# Patient Record
Sex: FEMALE | Race: WHITE | NOT HISPANIC OR LATINO | Employment: FULL TIME | URBAN - NONMETROPOLITAN AREA
[De-identification: names, ages, dates, MRNs, and addresses within clinical notes are randomized per-mention and may not be internally consistent; named-entity substitution may affect disease eponyms.]

---

## 2017-10-10 ENCOUNTER — OFFICE VISIT (OUTPATIENT)
Dept: OBSTETRICS AND GYNECOLOGY | Facility: CLINIC | Age: 32
End: 2017-10-10

## 2017-10-10 VITALS
HEIGHT: 65 IN | DIASTOLIC BLOOD PRESSURE: 68 MMHG | HEART RATE: 62 BPM | BODY MASS INDEX: 28.49 KG/M2 | SYSTOLIC BLOOD PRESSURE: 98 MMHG | WEIGHT: 171 LBS | RESPIRATION RATE: 16 BRPM

## 2017-10-10 DIAGNOSIS — Z01.419 ENCOUNTER FOR GYNECOLOGICAL EXAMINATION WITHOUT ABNORMAL FINDING: Primary | ICD-10-CM

## 2017-10-10 DIAGNOSIS — Z12.4 SCREENING FOR MALIGNANT NEOPLASM OF CERVIX: ICD-10-CM

## 2017-10-10 DIAGNOSIS — Z30.41 ENCOUNTER FOR SURVEILLANCE OF CONTRACEPTIVE PILLS: ICD-10-CM

## 2017-10-10 PROCEDURE — 99395 PREV VISIT EST AGE 18-39: CPT | Performed by: PHYSICIAN ASSISTANT

## 2017-10-10 RX ORDER — OXYCODONE AND ACETAMINOPHEN 7.5; 325 MG/1; MG/1
TABLET ORAL
Refills: 0 | COMMUNITY
Start: 2017-09-26 | End: 2018-11-01

## 2017-10-10 RX ORDER — LANSOPRAZOLE 30 MG/1
CAPSULE, DELAYED RELEASE ORAL
Refills: 0 | COMMUNITY
Start: 2017-07-27 | End: 2018-11-01

## 2017-10-10 RX ORDER — TRAZODONE HYDROCHLORIDE 50 MG/1
TABLET ORAL
Refills: 0 | COMMUNITY
Start: 2017-09-25 | End: 2018-11-01

## 2017-10-10 RX ORDER — MONTELUKAST SODIUM 10 MG/1
10 TABLET ORAL NIGHTLY
Refills: 0 | COMMUNITY
Start: 2017-09-25

## 2017-10-10 RX ORDER — CYCLOBENZAPRINE HCL 10 MG
10 TABLET ORAL NIGHTLY
Refills: 0 | COMMUNITY
Start: 2017-09-25 | End: 2021-03-26 | Stop reason: HOSPADM

## 2017-10-10 RX ORDER — LEVONORGESTREL AND ETHINYL ESTRADIOL 0.1-0.02MG
KIT ORAL
Refills: 11 | COMMUNITY
Start: 2017-07-21 | End: 2017-10-10 | Stop reason: ALTCHOICE

## 2017-10-10 RX ORDER — BUTALBITAL, ACETAMINOPHEN AND CAFFEINE 50; 325; 40 MG/1; MG/1; MG/1
TABLET ORAL
Refills: 0 | COMMUNITY
Start: 2017-09-25 | End: 2021-03-26 | Stop reason: HOSPADM

## 2017-10-10 RX ORDER — LEVONORGESTREL AND ETHINYL ESTRADIOL 0.1-0.02MG
1 KIT ORAL DAILY
Qty: 28 TABLET | Refills: 12 | Status: SHIPPED | OUTPATIENT
Start: 2017-10-10 | End: 2017-10-15 | Stop reason: SDUPTHER

## 2017-10-10 RX ORDER — ONDANSETRON 4 MG/1
TABLET, FILM COATED ORAL
Refills: 0 | COMMUNITY
Start: 2017-09-25 | End: 2021-02-05

## 2017-10-10 NOTE — PROGRESS NOTES
Subjective   Chief Complaint   Patient presents with   • Gynecologic Exam     Patient here for annual exam   • burning with urination       Tammi Collado is a 31 y.o. year old  presenting to be seen for her annual gynecological exam.   She is on oc's and desires refills-has gotten different generic the past few months which is Vienva and feeling hot flashes, night sweats- was on orsythia previously  Her LMP WAS 2017    She reports mild dysuria noted the last 2 days but has resolved-drinks lot of [place]     Past Medical History:   Diagnosis Date   • Acid reflux    • Anxiety    • Asthma    • Bladder incontinence    • Constipation    • Cough    • Depression    • Kidney stones    • Pain with urination    • Urinary frequency         Current Outpatient Prescriptions:   •  butalbital-acetaminophen-caffeine (FIORICET, ESGIC) -40 MG per tablet, take 1 tablet by mouth every 4 hours if needed, Disp: , Rfl: 0  •  cyclobenzaprine (FLEXERIL) 10 MG tablet, take 1 tablet by mouth at bedtime, Disp: , Rfl: 0  •  lansoprazole (PREVACID) 30 MG capsule, take 1 capsule by mouth once daily, Disp: , Rfl: 0  •  montelukast (SINGULAIR) 10 MG tablet, TAKE ONE TABLET BY MOUTH ONCE A DAY, Disp: , Rfl: 0  •  PROAIR  (90 Base) MCG/ACT inhaler, inhale 2 puffs by mouth every 4 to 6 hours if needed, Disp: , Rfl: 0  •  traZODone (DESYREL) 50 MG tablet, TAKE 1/2 OF A TABLET-3 TABLETS BY MOUTH ONCE A DAY AT BEDTIME, Disp: , Rfl: 0  •  ondansetron (ZOFRAN) 4 MG tablet, take 1 tablet by mouth every 6 hours if needed, Disp: , Rfl: 0  •  ORSYTHIA 0.1-20 MG-MCG per tablet, Take 1 tablet by mouth Daily., Disp: 28 tablet, Rfl: 12  •  oxyCODONE-acetaminophen (PERCOCET) 7.5-325 MG per tablet, take 1 tablet by mouth twice a day if needed for pain, Disp: , Rfl: 0   Allergies   Allergen Reactions   • Peanut (Diagnostic)    • Penicillins       Past Surgical History:   Procedure Laterality Date   • KIDNEY STONE SURGERY     •  "OOPHORECTOMY Right 2009      Social History     Social History   • Marital status:      Spouse name: N/A   • Number of children: N/A   • Years of education: N/A     Occupational History   • Not on file.     Social History Main Topics   • Smoking status: Current Every Day Smoker     Packs/day: 1.00   • Smokeless tobacco: Never Used   • Alcohol use No   • Drug use: No   • Sexual activity: Yes     Partners: Male     Other Topics Concern   • Not on file     Social History Narrative   • No narrative on file      Family History   Problem Relation Age of Onset   • Ovarian cancer Mother    • Ovarian cancer Paternal Grandmother    • Stroke Paternal Grandmother    • Diabetes Paternal Grandmother    • Hypertension Paternal Grandmother    • Hyperlipidemia Paternal Grandmother    • Cystic fibrosis Maternal Aunt        Review of Systems   HENT: Positive for sinus pressure.    Respiratory: Positive for cough and wheezing.    Gastrointestinal: Positive for constipation.   Endocrine: Positive for polydipsia.        Hot flashes   Genitourinary: Positive for dysuria.   Psychiatric/Behavioral: Positive for sleep disturbance.        Mood swings           Objective   BP 98/68 (BP Location: Left arm, Patient Position: Sitting, Cuff Size: Adult)  Pulse 62  Resp 16  Ht 65\" (165.1 cm)  Wt 171 lb (77.6 kg)  LMP 09/20/2017  BMI 28.46 kg/m2    Physical Exam   Constitutional: She appears well-developed and well-nourished. She is cooperative.   Pulmonary/Chest: Right breast exhibits no inverted nipple, no mass, no nipple discharge, no skin change and no tenderness. Left breast exhibits no inverted nipple, no mass, no nipple discharge, no skin change and no tenderness.   Abdominal: Soft. Normal appearance. There is no hepatosplenomegaly. There is no tenderness.   Genitourinary: Vagina normal and uterus normal. There is no tenderness or lesion on the right labia. There is no tenderness or lesion on the left labia. Cervix exhibits no " motion tenderness and no discharge. Right adnexum displays no mass and no tenderness. Left adnexum displays no mass and no tenderness.   Neurological: She is alert.   Skin: Skin is warm, dry and intact.   Psychiatric: She has a normal mood and affect. Her behavior is normal.            Assessment and Plan  Tammi was seen today for gynecologic exam and burning with urination.    Diagnoses and all orders for this visit:    Encounter for gynecological examination without abnormal finding    Screening for malignant neoplasm of cervix  -     Liquid-based Pap Smear, Screening - ThinPrep Vial, Cervix; Future    Other orders  -     ORSYTHIA 0.1-20 MG-MCG per tablet; Take 1 tablet by mouth Daily.    There are no Patient Instructions on file for this visit.           This note was electronically signed.    Frances Mocteuzma PA-C   October 10, 2017

## 2017-10-16 RX ORDER — LEVONORGESTREL AND ETHINYL ESTRADIOL 0.1-0.02MG
KIT ORAL
Qty: 84 TABLET | Refills: 3 | Status: SHIPPED | OUTPATIENT
Start: 2017-10-16 | End: 2018-11-01 | Stop reason: CLARIF

## 2017-10-20 DIAGNOSIS — Z12.4 SCREENING FOR MALIGNANT NEOPLASM OF CERVIX: ICD-10-CM

## 2018-08-20 RX ORDER — LEVONORGESTREL AND ETHINYL ESTRADIOL 0.1-0.02MG
1 KIT ORAL DAILY
Qty: 28 TABLET | Refills: 1 | Status: SHIPPED | OUTPATIENT
Start: 2018-08-20 | End: 2018-08-20 | Stop reason: SDUPTHER

## 2018-08-20 RX ORDER — LEVONORGESTREL AND ETHINYL ESTRADIOL 0.1-0.02MG
1 KIT ORAL DAILY
Qty: 84 TABLET | Refills: 0 | Status: SHIPPED | OUTPATIENT
Start: 2018-08-20 | End: 2019-08-20

## 2018-11-01 ENCOUNTER — OFFICE VISIT (OUTPATIENT)
Dept: OBSTETRICS AND GYNECOLOGY | Facility: CLINIC | Age: 33
End: 2018-11-01

## 2018-11-01 VITALS
DIASTOLIC BLOOD PRESSURE: 78 MMHG | HEIGHT: 65 IN | SYSTOLIC BLOOD PRESSURE: 116 MMHG | WEIGHT: 178 LBS | BODY MASS INDEX: 29.66 KG/M2

## 2018-11-01 DIAGNOSIS — Z01.419 ENCOUNTER FOR GYNECOLOGICAL EXAMINATION WITHOUT ABNORMAL FINDING: Primary | ICD-10-CM

## 2018-11-01 PROCEDURE — 99395 PREV VISIT EST AGE 18-39: CPT | Performed by: OBSTETRICS & GYNECOLOGY

## 2018-11-01 RX ORDER — CLONAZEPAM 1 MG/1
1 TABLET ORAL 2 TIMES DAILY PRN
Refills: 0 | COMMUNITY
Start: 2018-10-02 | End: 2021-03-26 | Stop reason: HOSPADM

## 2018-11-01 RX ORDER — IBUPROFEN 800 MG/1
800 TABLET ORAL 3 TIMES DAILY
Refills: 0 | COMMUNITY
Start: 2018-10-02 | End: 2021-03-26 | Stop reason: HOSPADM

## 2018-11-01 RX ORDER — VENLAFAXINE HYDROCHLORIDE 150 MG/1
150 CAPSULE, EXTENDED RELEASE ORAL DAILY
Refills: 0 | COMMUNITY
Start: 2018-10-02

## 2018-11-01 RX ORDER — MECLIZINE HYDROCHLORIDE 25 MG/1
TABLET ORAL
Refills: 0 | COMMUNITY
Start: 2018-10-04 | End: 2019-11-08

## 2018-11-01 RX ORDER — FEXOFENADINE HCL 180 MG/1
180 TABLET ORAL DAILY
Refills: 0 | COMMUNITY
Start: 2018-10-02 | End: 2019-11-08

## 2018-11-01 NOTE — PROGRESS NOTES
Subjective   Chief Complaint   Patient presents with   • Gynecologic Exam     :Last pap 2017 WNL   • Contraception     Needs yearly refill ( name brand only, 90 days at at time)     Tammi Collado is a 33 y.o. year old   presenting to be seen for her annual exam.  Doing well with Orsythia    SEXUAL Hx:  She is currently sexually active.  In the past year there has not been new sexual partners.    Condoms are not typically used.  She would not like to be screened for STD's at today's exam.  Current birth control method: OCP (estrogen/progesterone).  MENSTRUAL Hx:  Patient's last menstrual period was 10/15/2018.  In the past 6 months her cycles have been regular, predictable and occur monthly.   Her menstrual flow is normal.   Each month on average there are roughly 0 days of very heavy flow.    Intermenstrual bleeding is absent.    Post-coital bleeding is absent.  Dysmenorrhea: is not affecting her activities of daily living  PMS: is not affecting her activities of daily living  Her cycles are not a source of concern for her that she wishes to discuss today.  HEALTH Hx:  She exercises regularly: no (and has no plans to become more active).  She wears her seat belt:yes.  She has concerns about domestic violence: no.  OTHER COMPLAINTS:  Nothing else    The following portions of the patient's history were reviewed and updated as appropriate:  She  has a past medical history of Acid reflux; Anxiety; Asthma; Bladder incontinence; Constipation; Cough; Depression; Kidney stones; Pain with urination; and Urinary frequency.  She  does not have a problem list on file.  She  has a past surgical history that includes Kidney stone surgery () and Oophorectomy (Right, 2009).  Her family history includes Cystic fibrosis in her maternal aunt; Diabetes in her paternal grandmother; Hyperlipidemia in her paternal grandmother; Hypertension in her paternal grandmother; Ovarian cancer in her mother and paternal grandmother; Stroke  in her paternal grandmother.  She  reports that she has been smoking.  She has been smoking about 1.00 pack per day. She has never used smokeless tobacco. She reports that she does not drink alcohol or use drugs.  Current Outpatient Prescriptions   Medication Sig Dispense Refill   • butalbital-acetaminophen-caffeine (FIORICET, ESGIC) -40 MG per tablet take 1 tablet by mouth every 4 hours if needed  0   • cyclobenzaprine (FLEXERIL) 10 MG tablet take 1 tablet by mouth at bedtime  0   • montelukast (SINGULAIR) 10 MG tablet TAKE ONE TABLET BY MOUTH ONCE A DAY  0   • ondansetron (ZOFRAN) 4 MG tablet take 1 tablet by mouth every 6 hours if needed  0   • ORSYTHIA 0.1-20 MG-MCG per tablet Take 1 tablet by mouth Daily. 84 tablet 0   • PROAIR  (90 Base) MCG/ACT inhaler inhale 2 puffs by mouth every 4 to 6 hours if needed  0   • clonazePAM (KlonoPIN) 1 MG tablet   0   • fexofenadine (ALLEGRA) 180 MG tablet Take 180 mg by mouth Daily.  0   • ibuprofen (ADVIL,MOTRIN) 800 MG tablet Take 800 mg by mouth 3 (Three) Times a Day.  0   • meclizine (ANTIVERT) 25 MG tablet take 1/2 to 1 tablet by mouth four times a day for dizziness  0   • venlafaxine XR (EFFEXOR-XR) 150 MG 24 hr capsule Take 150 mg by mouth Daily.  0     No current facility-administered medications for this visit.      Current Outpatient Prescriptions on File Prior to Visit   Medication Sig   • butalbital-acetaminophen-caffeine (FIORICET, ESGIC) -40 MG per tablet take 1 tablet by mouth every 4 hours if needed   • cyclobenzaprine (FLEXERIL) 10 MG tablet take 1 tablet by mouth at bedtime   • montelukast (SINGULAIR) 10 MG tablet TAKE ONE TABLET BY MOUTH ONCE A DAY   • ondansetron (ZOFRAN) 4 MG tablet take 1 tablet by mouth every 6 hours if needed   • ORSYTHIA 0.1-20 MG-MCG per tablet Take 1 tablet by mouth Daily.   • PROAIR  (90 Base) MCG/ACT inhaler inhale 2 puffs by mouth every 4 to 6 hours if needed   • [DISCONTINUED] lansoprazole (PREVACID) 30  "MG capsule take 1 capsule by mouth once daily   • [DISCONTINUED] oxyCODONE-acetaminophen (PERCOCET) 7.5-325 MG per tablet take 1 tablet by mouth twice a day if needed for pain   • [DISCONTINUED] traZODone (DESYREL) 50 MG tablet TAKE 1/2 OF A TABLET-3 TABLETS BY MOUTH ONCE A DAY AT BEDTIME   • [DISCONTINUED] VIENVA 0.1-20 MG-MCG per tablet TAKE 1 TABLET BY MOUTH EVERY DAY     No current facility-administered medications on file prior to visit.      She is allergic to peanut (diagnostic) and penicillins..    Smoking status: Current Every Day Smoker                                                   Packs/day: 1.00      Years: 0.00      Smokeless tobacco: Never Used                        Review of Systems  Consitutional NEG: anorexia or night sweats    POS: nothing reported   Gastointestinal NEG: bloating, change in bowel habits, melena or reflux symptoms    POS: nothing reported   Genitourinary NEG: dysuria or hematuria    POS: nothing reported   Integument NEG: moles that are changing in size, shape, color or rashes    POS: nothing reported   Breast NEG: persistent breast lump, skin dimpling or nipple discharge    POS: nothing reported          Objective   /78   Ht 165.1 cm (65\")   Wt 80.7 kg (178 lb)   LMP 10/15/2018   BMI 29.62 kg/m²     General:  well developed; well nourished  no acute distress   Skin:  No suspicious lesions seen   Thyroid: normal to inspection and palpation   Breasts:  Examined in supine position  Symmetric without masses or skin dimpling  Nipples normal without inversion, lesions or discharge  There are no palpable axillary nodes   Abdomen: soft, non-tender; no masses  no umbilical or inginual hernias are present  no hepato-splenomegaly   Pelvis: Clinical staff was present for exam  External genitalia:  normal appearance of the external genitalia including Bartholin's and [place]'s glands.  :  urethral meatus normal;  Vaginal:  normal pink mucosa without prolapse or lesions.  Cervix:  " normal appearance.  Uterus:  normal size, shape and consistency.  Adnexa:  normal bimanual exam of the adnexa.  Rectal:  digital rectal exam not performed; anus visually normal appearing.        Assessment   1. Normal PE     Plan   1. PAP done  2. Orsythia refilled, slight AUB /psotting with it  Follow up if AUB persists  No orders of the defined types were placed in this encounter.         This note was electronically signed.      November 1, 2018

## 2018-11-19 DIAGNOSIS — Z01.419 ENCOUNTER FOR GYNECOLOGICAL EXAMINATION WITHOUT ABNORMAL FINDING: ICD-10-CM

## 2018-12-13 ENCOUNTER — TELEPHONE (OUTPATIENT)
Dept: OBSTETRICS AND GYNECOLOGY | Facility: CLINIC | Age: 33
End: 2018-12-13

## 2018-12-13 RX ORDER — LEVONORGESTREL AND ETHINYL ESTRADIOL 0.1-0.02MG
1 KIT ORAL DAILY
Qty: 28 TABLET | Refills: 0 | Status: SHIPPED | OUTPATIENT
Start: 2018-12-13 | End: 2019-11-08

## 2018-12-13 NOTE — TELEPHONE ENCOUNTER
----- Message from Laila Marin sent at 12/13/2018 11:55 AM EST -----  Contact: PATIENT  PT NEEDS ONE PACK OF ORSYTHIA SENT IN TO THE PHARMACY UNTIL SHE GETS HER MAIL ORDER. IT HAS TO BE NAME BRAND SHE STATES    JASON BRIGHT

## 2019-11-08 ENCOUNTER — OFFICE VISIT (OUTPATIENT)
Dept: OBSTETRICS AND GYNECOLOGY | Facility: CLINIC | Age: 34
End: 2019-11-08

## 2019-11-08 VITALS
HEIGHT: 65 IN | DIASTOLIC BLOOD PRESSURE: 70 MMHG | WEIGHT: 167 LBS | SYSTOLIC BLOOD PRESSURE: 112 MMHG | BODY MASS INDEX: 27.82 KG/M2

## 2019-11-08 DIAGNOSIS — Z01.419 ENCOUNTER FOR GYNECOLOGICAL EXAMINATION WITHOUT ABNORMAL FINDING: Primary | ICD-10-CM

## 2019-11-08 PROCEDURE — 99395 PREV VISIT EST AGE 18-39: CPT | Performed by: OBSTETRICS & GYNECOLOGY

## 2019-11-08 RX ORDER — AMITRIPTYLINE HYDROCHLORIDE 10 MG/1
25 TABLET, FILM COATED ORAL NIGHTLY
Refills: 0 | COMMUNITY
Start: 2019-10-08 | End: 2020-12-02 | Stop reason: DRUGHIGH

## 2019-11-08 RX ORDER — HYDROXYZINE PAMOATE 25 MG/1
CAPSULE ORAL
Refills: 0 | COMMUNITY
Start: 2019-10-28 | End: 2020-08-25

## 2019-11-08 RX ORDER — FLUTICASONE PROPIONATE 50 MCG
SPRAY, SUSPENSION (ML) NASAL
COMMUNITY
End: 2020-08-25

## 2019-11-08 RX ORDER — GALCANEZUMAB 120 MG/ML
INJECTION, SOLUTION SUBCUTANEOUS
Refills: 0 | COMMUNITY
Start: 2019-10-29 | End: 2020-08-25 | Stop reason: SDUPTHER

## 2019-11-08 RX ORDER — EPINEPHRINE 0.3 MG/.3ML
INJECTION SUBCUTANEOUS
COMMUNITY

## 2019-11-08 RX ORDER — LEVONORGESTREL / ETHINYL ESTRADIOL AND ETHINYL ESTRADIOL 150-30(84)
1 KIT ORAL DAILY
Qty: 84 EACH | Refills: 4 | Status: SHIPPED | OUTPATIENT
Start: 2019-11-08 | End: 2020-08-25

## 2019-11-08 RX ORDER — SUMATRIPTAN 100 MG/1
TABLET, FILM COATED ORAL
Refills: 0 | COMMUNITY
Start: 2019-10-28

## 2019-11-08 NOTE — PROGRESS NOTES
Subjective   Chief Complaint   Patient presents with   • Gynecologic Exam     last pap 2018 WNL , states she has not been taking her BC for over a month due to pharmacy not having in stock     Tammi Collado is a 34 y.o. year old  presenting to be seen for her annual exam. C/o Left sided pimple like spot for couple of weeks.   SEXUAL Hx:  She is currently sexually active.  In the past year there has not been new sexual partners.    Condoms are not typically used.  She would not like to be screened for STD's at today's exam.  Current birth control method: OCP (estrogen/progesterone).  MENSTRUAL Hx:  Patient's last menstrual period was 10/15/2019.  In the past 6 months her cycles have been regular, predictable and occur monthly.   Her menstrual flow is normal.   Each month on average there are roughly 0 days of very heavy flow.    Intermenstrual bleeding is absent.    Post-coital bleeding is absent.  Dysmenorrhea: is not affecting her activities of daily living  PMS: is not affecting her activities of daily living  Her cycles are not a source of concern for her that she wishes to discuss today.  HEALTH Hx:  She exercises regularly: no (and has no plans to become more active).  She wears her seat belt:yes.  She has concerns about domestic violence: no.  OTHER COMPLAINTS:  Nothing else    The following portions of the patient's history were reviewed and updated as appropriate:  She  has a past medical history of Acid reflux, Anxiety, Asthma, Bladder incontinence, Constipation, Cough, Depression, Kidney stones, Pain with urination, and Urinary frequency.  She does not have a problem list on file.  She  has a past surgical history that includes Kidney stone surgery () and Oophorectomy (Right, 2009).  Her family history includes Cystic fibrosis in her maternal aunt; Diabetes in her paternal grandmother; Hyperlipidemia in her paternal grandmother; Hypertension in her paternal grandmother; Ovarian cancer in her  mother and paternal grandmother; Stroke in her paternal grandmother.  She  reports that she has been smoking.  She has been smoking about 1.00 pack per day. She has never used smokeless tobacco. She reports that she does not drink alcohol or use drugs.  Current Outpatient Medications   Medication Sig Dispense Refill   • butalbital-acetaminophen-caffeine (FIORICET, ESGIC) -40 MG per tablet take 1 tablet by mouth every 4 hours if needed  0   • clonazePAM (KlonoPIN) 1 MG tablet   0   • cyclobenzaprine (FLEXERIL) 10 MG tablet take 1 tablet by mouth at bedtime  0   • ibuprofen (ADVIL,MOTRIN) 800 MG tablet Take 800 mg by mouth 3 (Three) Times a Day.  0   • montelukast (SINGULAIR) 10 MG tablet TAKE ONE TABLET BY MOUTH ONCE A DAY  0   • ondansetron (ZOFRAN) 4 MG tablet take 1 tablet by mouth every 6 hours if needed  0   • PROAIR  (90 Base) MCG/ACT inhaler inhale 2 puffs by mouth every 4 to 6 hours if needed  0   • venlafaxine XR (EFFEXOR-XR) 150 MG 24 hr capsule Take 150 mg by mouth Daily.  0   • amitriptyline (ELAVIL) 10 MG tablet take 1 tablet by mouth every evening AT 8 PM AND MAY INCREASE TO ...  (REFER TO PRESCRIPTION NOTES).  0   • EMGALITY 120 MG/ML prefilled syringe   0   • EPINEPHrine (EPIPEN) 0.3 MG/0.3ML solution auto-injector injection epinephrine 0.3 mg/0.3 mL injection, auto-injector     • fluticasone (FLONASE) 50 MCG/ACT nasal spray fluticasone propionate 50 mcg/actuation nasal spray,suspension     • hydrOXYzine pamoate (VISTARIL) 25 MG capsule take 1 capsule by mouth twice a day for anxiety  0   • levonorgestrel-ethinyl estradiol (AVIANE,ALESSE,LESSINA) 0.1-20 MG-MCG per tablet Take 1 tablet by mouth Daily. 28 tablet 0   • SUMAtriptan (IMITREX) 100 MG tablet   0     No current facility-administered medications for this visit.      Current Outpatient Medications on File Prior to Visit   Medication Sig   • butalbital-acetaminophen-caffeine (FIORICET, ESGIC) -40 MG per tablet take 1 tablet by  mouth every 4 hours if needed   • clonazePAM (KlonoPIN) 1 MG tablet    • cyclobenzaprine (FLEXERIL) 10 MG tablet take 1 tablet by mouth at bedtime   • ibuprofen (ADVIL,MOTRIN) 800 MG tablet Take 800 mg by mouth 3 (Three) Times a Day.   • montelukast (SINGULAIR) 10 MG tablet TAKE ONE TABLET BY MOUTH ONCE A DAY   • ondansetron (ZOFRAN) 4 MG tablet take 1 tablet by mouth every 6 hours if needed   • PROAIR  (90 Base) MCG/ACT inhaler inhale 2 puffs by mouth every 4 to 6 hours if needed   • venlafaxine XR (EFFEXOR-XR) 150 MG 24 hr capsule Take 150 mg by mouth Daily.   • amitriptyline (ELAVIL) 10 MG tablet take 1 tablet by mouth every evening AT 8 PM AND MAY INCREASE TO ...  (REFER TO PRESCRIPTION NOTES).   • EMGALITY 120 MG/ML prefilled syringe    • EPINEPHrine (EPIPEN) 0.3 MG/0.3ML solution auto-injector injection epinephrine 0.3 mg/0.3 mL injection, auto-injector   • fluticasone (FLONASE) 50 MCG/ACT nasal spray fluticasone propionate 50 mcg/actuation nasal spray,suspension   • hydrOXYzine pamoate (VISTARIL) 25 MG capsule take 1 capsule by mouth twice a day for anxiety   • levonorgestrel-ethinyl estradiol (AVIANE,ALESSE,LESSINA) 0.1-20 MG-MCG per tablet Take 1 tablet by mouth Daily.   • SUMAtriptan (IMITREX) 100 MG tablet    • [DISCONTINUED] fexofenadine (ALLEGRA) 180 MG tablet Take 180 mg by mouth Daily.   • [DISCONTINUED] meclizine (ANTIVERT) 25 MG tablet take 1/2 to 1 tablet by mouth four times a day for dizziness     No current facility-administered medications on file prior to visit.      She is allergic to peanut (diagnostic) and penicillins..    Social History    Tobacco Use      Smoking status: Current Every Day Smoker        Packs/day: 1.00      Smokeless tobacco: Never Used    Review of Systems  Consitutional NEG: anorexia or night sweats    POS: nothing reported   Gastointestinal NEG: bloating, change in bowel habits, melena or reflux symptoms    POS: nothing reported   Genitourinary NEG: dysuria or  "hematuria    POS: nothing reported   Integument NEG: moles that are changing in size, shape, color or rashes    POS: nothing reported   Breast NEG: persistent breast lump, skin dimpling or nipple discharge    POS: nothing reported          Objective   /70   Ht 165.1 cm (65\")   Wt 75.8 kg (167 lb)   LMP 10/15/2019   BMI 27.79 kg/m²     General:  well developed; well nourished  no acute distress   Skin:  No suspicious lesions seen   Thyroid: normal to inspection and palpation   Breasts:  Examined in supine position  Symmetric without masses or skin dimpling  Nipples normal without inversion, lesions or discharge  There are no palpable axillary nodes   Abdomen: soft, non-tender; no masses  no umbilical or inguinal hernias are present  no hepato-splenomegaly   Pelvis: Clinical staff was present for exam  External genitalia:  normal appearance of the external genitalia including Bartholin's and [place]'s glands.  :  urethral meatus normal;  Vaginal:  normal pink mucosa without prolapse or lesions.  Cervix:  normal appearance.  Uterus:  normal size, shape and consistency.  Adnexa:  normal bimanual exam of the adnexa.  Rectal:  digital rectal exam not performed; anus visually normal appearing.        Assessment   1. Annual PE WNL  2. PAtient states cousin with Bresat Ca @ 36 yoa and this cousin's motther also with Breast Ca - normal bresat exam-- start MMG @ 35 yoa  3. Menstrual migraines     Plan   1. PAP done  2. Change over to [place]  3. Simple left inclsuion cyst-- hot moist comporesses    No orders of the defined types were placed in this encounter.         This note was electronically signed.      November 8, 2019    "

## 2019-11-18 DIAGNOSIS — Z01.419 ENCOUNTER FOR GYNECOLOGICAL EXAMINATION WITHOUT ABNORMAL FINDING: ICD-10-CM

## 2020-08-25 ENCOUNTER — OFFICE VISIT (OUTPATIENT)
Dept: NEUROLOGY | Facility: CLINIC | Age: 35
End: 2020-08-25

## 2020-08-25 ENCOUNTER — LAB (OUTPATIENT)
Dept: LAB | Facility: HOSPITAL | Age: 35
End: 2020-08-25

## 2020-08-25 VITALS
DIASTOLIC BLOOD PRESSURE: 76 MMHG | WEIGHT: 171.2 LBS | BODY MASS INDEX: 28.52 KG/M2 | SYSTOLIC BLOOD PRESSURE: 112 MMHG | HEART RATE: 84 BPM | HEIGHT: 65 IN | OXYGEN SATURATION: 99 % | TEMPERATURE: 98.4 F

## 2020-08-25 DIAGNOSIS — G43.719 INTRACTABLE CHRONIC MIGRAINE WITHOUT AURA AND WITHOUT STATUS MIGRAINOSUS: ICD-10-CM

## 2020-08-25 DIAGNOSIS — G43.719 INTRACTABLE CHRONIC MIGRAINE WITHOUT AURA AND WITHOUT STATUS MIGRAINOSUS: Primary | ICD-10-CM

## 2020-08-25 DIAGNOSIS — Z71.6 ENCOUNTER FOR SMOKING CESSATION COUNSELING: ICD-10-CM

## 2020-08-25 LAB
ALBUMIN SERPL-MCNC: 4.5 G/DL (ref 3.5–5.2)
ALBUMIN/GLOB SERPL: 2 G/DL
ALP SERPL-CCNC: 55 U/L (ref 39–117)
ALT SERPL W P-5'-P-CCNC: 22 U/L (ref 1–33)
ANION GAP SERPL CALCULATED.3IONS-SCNC: 12.2 MMOL/L (ref 5–15)
AST SERPL-CCNC: 20 U/L (ref 1–32)
BILIRUB SERPL-MCNC: 0.3 MG/DL (ref 0–1.2)
BUN SERPL-MCNC: 12 MG/DL (ref 6–20)
BUN/CREAT SERPL: 12.8 (ref 7–25)
CALCIUM SPEC-SCNC: 9.8 MG/DL (ref 8.6–10.5)
CHLORIDE SERPL-SCNC: 102 MMOL/L (ref 98–107)
CO2 SERPL-SCNC: 24.8 MMOL/L (ref 22–29)
CREAT SERPL-MCNC: 0.94 MG/DL (ref 0.57–1)
FOLATE SERPL-MCNC: >20 NG/ML (ref 4.78–24.2)
GFR SERPL CREATININE-BSD FRML MDRD: 69 ML/MIN/1.73
GLOBULIN UR ELPH-MCNC: 2.3 GM/DL
GLUCOSE SERPL-MCNC: 76 MG/DL (ref 65–99)
POTASSIUM SERPL-SCNC: 3.7 MMOL/L (ref 3.5–5.2)
PROT SERPL-MCNC: 6.8 G/DL (ref 6–8.5)
SODIUM SERPL-SCNC: 139 MMOL/L (ref 136–145)
TSH SERPL DL<=0.05 MIU/L-ACNC: 1.5 UIU/ML (ref 0.27–4.2)
VIT B12 BLD-MCNC: 968 PG/ML (ref 211–946)

## 2020-08-25 PROCEDURE — 84443 ASSAY THYROID STIM HORMONE: CPT

## 2020-08-25 PROCEDURE — 36415 COLL VENOUS BLD VENIPUNCTURE: CPT

## 2020-08-25 PROCEDURE — 82607 VITAMIN B-12: CPT

## 2020-08-25 PROCEDURE — 80053 COMPREHEN METABOLIC PANEL: CPT

## 2020-08-25 PROCEDURE — 82746 ASSAY OF FOLIC ACID SERUM: CPT

## 2020-08-25 PROCEDURE — 83921 ORGANIC ACID SINGLE QUANT: CPT

## 2020-08-25 PROCEDURE — 99204 OFFICE O/P NEW MOD 45 MIN: CPT | Performed by: NURSE PRACTITIONER

## 2020-08-25 RX ORDER — GALCANEZUMAB 120 MG/ML
120 INJECTION, SOLUTION SUBCUTANEOUS
Qty: 1.12 ML | Refills: 3 | Status: SHIPPED | OUTPATIENT
Start: 2020-08-25 | End: 2021-04-02

## 2020-08-25 RX ORDER — CYPROHEPTADINE HYDROCHLORIDE 4 MG/1
4 TABLET ORAL EVERY EVENING
Qty: 30 TABLET | Refills: 2 | Status: SHIPPED | OUTPATIENT
Start: 2020-08-25 | End: 2020-12-02 | Stop reason: SDUPTHER

## 2020-08-25 NOTE — PROGRESS NOTES
New Neurology Patient Office Visit      Patient Name: Tammi Collado    Referring Physician: Narinder Sims MD    Chief Complaint:    Chief Complaint   Patient presents with   • Migraine     NP, is here for a consult.         History of Present Illness: Tammi Collado is a 34 y.o. female who is here today to establish care with Neurology.  She was previously treated by Dr. Jackson at [facility] with monthly Emgality injections. She currently has a chronic daily headache, and estimates at least 4 migraine attacks in the past month. Her migraine headaches worsen around her menses, and can last for greater than 24 hours.  She describes her migraine as intense unilateral throbbing, focused around her eyes. She does get nauseous with these episodes. She has adequate relief of migraine episodes with Advil 400mg and Imitrex 100mg. She has tried Treximet in the past without benefit.  She is not planning pregnancy as her  has had a vasectomy.  The following portions of the patient's history were reviewed and updated as appropriate: allergies, current medications, past family history, past medical history, past social history, past surgical history and problem list.    Subjective      Review of Systems:   Review of Systems   Constitutional: Negative for activity change and fatigue.   HENT: Negative for drooling and voice change.    Eyes: Positive for visual disturbance. Negative for blurred vision, double vision and photophobia.   Respiratory: Negative for shortness of breath.    Cardiovascular: Negative for chest pain and palpitations.   Gastrointestinal: Positive for nausea. Negative for vomiting.   Genitourinary: Negative for urinary incontinence.   Musculoskeletal: Negative for arthralgias, back pain, gait problem, myalgias and neck pain.   Allergic/Immunologic: Negative for immunocompromised state.   Neurological: Positive for headache. Negative for dizziness, tremors, seizures, syncope, facial asymmetry, speech  difficulty, weakness, light-headedness, numbness, memory problem and confusion.   Hematological: Does not bruise/bleed easily.   Psychiatric/Behavioral: Negative for decreased concentration, dysphoric mood, hallucinations, sleep disturbance, depressed mood and stress. The patient is not nervous/anxious.        Past Medical History:   Past Medical History:   Diagnosis Date   • Acid reflux    • Allergy    • Anxiety    • Arthritis    • Asthma    • Bladder incontinence    • Constipation    • Cough    • Depression    • Depression    • Difficulty walking    • Kidney stones    • Migraine    • Pain with urination    • Urinary frequency        Past Surgical History:   Past Surgical History:   Procedure Laterality Date   • KIDNEY STONE SURGERY  2007   • OOPHORECTOMY Right 2009       Family History:   Family History   Problem Relation Age of Onset   • Ovarian cancer Mother    • Ovarian cancer Paternal Grandmother    • Stroke Paternal Grandmother    • Diabetes Paternal Grandmother    • Hypertension Paternal Grandmother    • Hyperlipidemia Paternal Grandmother    • Cystic fibrosis Maternal Aunt        Social History:   Social History     Socioeconomic History   • Marital status:      Spouse name: Not on file   • Number of children: Not on file   • Years of education: Not on file   • Highest education level: Not on file   Tobacco Use   • Smoking status: Current Every Day Smoker     Packs/day: 1.00   • Smokeless tobacco: Never Used   Substance and Sexual Activity   • Alcohol use: No   • Drug use: No   • Sexual activity: Yes     Partners: Male       Medications:     Current Outpatient Medications:   •  amitriptyline (ELAVIL) 10 MG tablet, Take 25 mg by mouth Every Night., Disp: , Rfl: 0  •  butalbital-acetaminophen-caffeine (FIORICET, ESGIC) -40 MG per tablet, take 1 tablet by mouth every 4 hours if needed, Disp: , Rfl: 0  •  clonazePAM (KlonoPIN) 1 MG tablet, , Disp: , Rfl: 0  •  cyclobenzaprine (FLEXERIL) 10 MG  "tablet, take 1 tablet by mouth at bedtime, Disp: , Rfl: 0  •  EMGALITY 120 MG/ML prefilled syringe, Inject 1 mL under the skin into the appropriate area as directed Every 30 (Thirty) Days., Disp: 1.12 mL, Rfl: 3  •  EPINEPHrine (EPIPEN) 0.3 MG/0.3ML solution auto-injector injection, epinephrine 0.3 mg/0.3 mL injection, auto-injector, Disp: , Rfl:   •  ibuprofen (ADVIL,MOTRIN) 800 MG tablet, Take 800 mg by mouth 3 (Three) Times a Day., Disp: , Rfl: 0  •  montelukast (SINGULAIR) 10 MG tablet, TAKE ONE TABLET BY MOUTH ONCE A DAY, Disp: , Rfl: 0  •  ondansetron (ZOFRAN) 4 MG tablet, take 1 tablet by mouth every 6 hours if needed, Disp: , Rfl: 0  •  PROAIR  (90 Base) MCG/ACT inhaler, inhale 2 puffs by mouth every 4 to 6 hours if needed, Disp: , Rfl: 0  •  SUMAtriptan (IMITREX) 100 MG tablet, , Disp: , Rfl: 0  •  venlafaxine XR (EFFEXOR-XR) 150 MG 24 hr capsule, Take 150 mg by mouth Daily., Disp: , Rfl: 0  •  cyproheptadine (PERIACTIN) 4 MG tablet, Take 1 tablet by mouth Every Evening., Disp: 30 tablet, Rfl: 2    Allergies:   Allergies   Allergen Reactions   • Levofloxacin Hives   • Peanut (Diagnostic)    • Penicillins    • Adhesive Tape Rash   • Shellfish Allergy Rash       Objective     Physical Exam:  Vital Signs:   Vitals:    08/25/20 1118   BP: 112/76   BP Location: Left arm   Patient Position: Sitting   Cuff Size: Adult   Pulse: 84   Temp: 98.4 °F (36.9 °C)   SpO2: 99%   Weight: 77.7 kg (171 lb 3.2 oz)   Height: 165.1 cm (65\")   PainSc:   2   PainLoc: Head       Physical Exam   Constitutional: She is oriented to person, place, and time.   Eyes: Pupils are equal, round, and reactive to light. EOM are normal.   Cardiovascular: Regular rhythm and normal heart sounds.   Pulmonary/Chest: Effort normal and breath sounds normal.   Neurological: She is oriented to person, place, and time. She has normal strength. She has a normal Romberg Test. Gait normal.   Skin: Skin is warm. Capillary refill takes less than 2 " seconds.   Psychiatric: She has a normal mood and affect. Her speech is normal and behavior is normal. Judgment and thought content normal.   Nursing note and vitals reviewed.      Neurologic Exam     Mental Status   Oriented to person, place, and time.   Attention: normal. Concentration: normal.   Speech: speech is normal   Level of consciousness: alert  Knowledge: consistent with education.     Cranial Nerves     CN II   Visual fields full to confrontation.   Visual acuity: normal    CN III, IV, VI   Pupils are equal, round, and reactive to light.  Extraocular motions are normal.   Right pupil: Shape: regular. Reactivity: brisk.   Left pupil: Shape: regular. Reactivity: brisk.   Diplopia: none  Ophthalmoparesis: none  Upgaze: normal  Downgaze: normal    CN V   Facial sensation intact.     CN VII   Facial expression full, symmetric.     CN VIII   CN VIII normal.     CN IX, X   CN IX normal.   CN X normal.     CN XI   CN XI normal.     CN XII   CN XII normal.     Motor Exam   Muscle bulk: normal  Overall muscle tone: normal    Strength   Strength 5/5 throughout.     Sensory Exam   Light touch normal.     Gait, Coordination, and Reflexes     Gait  Gait: normal    Coordination   Romberg: negative    Tremor   Resting tremor: absent    Reflexes   Reflexes 2+ except as noted.        Results Review:   I reviewed the patient's new clinical results.  I have reviewed the patient's other medical records to include, labs, radiology and referrals.     Assessment / Plan      Assessment/Plan:   Tammi was seen today for migraine.    Diagnoses and all orders for this visit:    Intractable chronic migraine without aura and without status migrainosus  -     cyproheptadine (PERIACTIN) 4 MG tablet; Take 1 tablet by mouth Every Evening.  -     Comprehensive Metabolic Panel; Future  -     Folate; Future  -     Methylmalonic Acid, Serum; Future  -     Vitamin B12; Future  -     TSH; Future  -     EMGALITY 120 MG/ML prefilled syringe;  Inject 1 mL under the skin into the appropriate area as directed Every 30 (Thirty) Days.    Patient has had reduction in monthly migraine episodes with Emgality, however continues to experience chronic daily headache and frequent migraine attacks. She has been prescribed PeriActin for additional daily headache relief. She has been ordered for CMP and B vitamin studies to assess for metabolic causes of headaches. She will continue Emgality for migraine reduction.  This patient experiences 30/30 headache days per month with at least 8 days being severe migraine headaches. Migraine headaches last >4hour/day and have been present for greater than 6 consecutive months. Characteristics of migraines include: unilateral, pulsating, moderate to severe intensity, worsened by physical exertion, photophobia, and nausea and/or vomiting. Patient has tried and failed medications for migraine prevention for 3 months or greater: Topamax, Amitriptyline, Propranolol, Treximet, Maxalt, SSRIs, BBs, AEDs, NSAIDs, Muscle Relaxers, and Tylenol. I have recommended Botox using FDA approved protocol for chronic daily headache and migraine prevention resistant to prior regimens as listed above. We have discussed risk and benefits of this procedure and common side effects including headache, neck pain, neck stiffness or weakness, ptosis, flu-like symptoms as well as more serious possible adverse effects including possible dysphagia, respiratory distress or even death (death has only been reported once with adults for Botox for migraines in another state when mixed with lidocaine solution which we do not use lidocaine solution in our practice for mixing Botox). Verbalizes understanding, accepts risks and agrees with moving forward with Botox injections for chronic migraine prevention.    Encounter for smoking cessation counseling  Tammi Collado  reports that she has been smoking. She has been smoking about 1.00 pack per day. She has never used  smokeless tobacco.. I have educated her on the risk of diseases from using tobacco products such as cancer, COPD, heart diease and increased headache frequency.     I advised her to quit and she is not willing to quit.    I spent 5 minutes counseling the patient.     Follow Up:   Return for Botox Injections.       SLADE Crow  [facility]       Please note that portions of this note may have been completed with a voice recognition program. Efforts were made to edit the dictations, but occasionally words are mistranscribed.

## 2020-08-25 NOTE — PATIENT INSTRUCTIONS
You will begin PeriActin (cyproheptadine) every evening to help prevent headaches. We will also plan to begin botox injections on your for relief of both every day headaches and migraine headaches.  Migraine Headache  A migraine headache is an intense, throbbing pain on one side or both sides of the head. Migraine headaches may also cause other symptoms, such as nausea, vomiting, and sensitivity to light and noise. A migraine headache can last from 4 hours to 3 days. Talk with your doctor about what things may bring on (trigger) your migraine headaches.  What are the causes?  The exact cause of this condition is not known. However, a migraine may be caused when nerves in the brain become irritated and release chemicals that cause inflammation of blood vessels. This inflammation causes pain. This condition may be triggered or caused by:  · Drinking alcohol.  · Smoking.  · Taking medicines, such as:  ? Medicine used to treat chest pain (nitroglycerin).  ? Birth control pills.  ? Estrogen.  ? Certain blood pressure medicines.  · Eating or drinking products that contain nitrates, glutamate, aspartame, or tyramine. Aged cheeses, chocolate, or caffeine may also be triggers.  · Doing physical activity.  Other things that may trigger a migraine headache include:  · Menstruation.  · Pregnancy.  · Hunger.  · Stress.  · Lack of sleep or too much sleep.  · Weather changes.  · Fatigue.  What increases the risk?  The following factors may make you more likely to experience migraine headaches:  · Being a certain age. This condition is more common in people who are 25-55 years old.  · Being female.  · Having a family history of migraine headaches.  · Being .  · Having a mental health condition, such as depression or anxiety.  · Being obese.  What are the signs or symptoms?  The main symptom of this condition is pulsating or throbbing pain. This pain may:  · Happen in any area of the head, such as on one side or both  sides.  · Interfere with daily activities.  · Get worse with physical activity.  · Get worse with exposure to bright lights or loud noises.  Other symptoms may include:  · Nausea.  · Vomiting.  · Dizziness.  · General sensitivity to bright lights, loud noises, or smells.  Before you get a migraine headache, you may get warning signs (an aura). An aura may include:  · Seeing flashing lights or having blind spots.  · Seeing bright spots, halos, or zigzag lines.  · Having tunnel vision or blurred vision.  · Having numbness or a tingling feeling.  · Having trouble talking.  · Having muscle weakness.  Some people have symptoms after a migraine headache (postdromal phase), such as:  · Feeling tired.  · Difficulty concentrating.  How is this diagnosed?  A migraine headache can be diagnosed based on:  · Your symptoms.  · A physical exam.  · Tests, such as:  ? CT scan or an MRI of the head. These imaging tests can help rule out other causes of headaches.  ? Taking fluid from the spine (lumbar puncture) and analyzing it (cerebrospinal fluid analysis, or CSF analysis).  How is this treated?  This condition may be treated with medicines that:  · Relieve pain.  · Relieve nausea.  · Prevent migraine headaches.  Treatment for this condition may also include:  · Acupuncture.  · Lifestyle changes like avoiding foods that trigger migraine headaches.  · Biofeedback.  · Cognitive behavioral therapy.  Follow these instructions at home:  Medicines  · Take over-the-counter and prescription medicines only as told by your health care provider.  · Ask your health care provider if the medicine prescribed to you:  ? Requires you to avoid driving or using heavy machinery.  ? Can cause constipation. You may need to take these actions to prevent or treat constipation:  § Drink enough fluid to keep your urine pale yellow.  § Take over-the-counter or prescription medicines.  § Eat foods that are high in fiber, such as beans, whole grains, and fresh  fruits and vegetables.  § Limit foods that are high in fat and processed sugars, such as fried or sweet foods.  Lifestyle  · Do not drink alcohol.  · Do not use any products that contain nicotine or tobacco, such as cigarettes, e-cigarettes, and chewing tobacco. If you need help quitting, ask your health care provider.  · Get at least 8 hours of sleep every night.  · Find ways to manage stress, such as meditation, deep breathing, or yoga.  General instructions         · Keep a journal to find out what may trigger your migraine headaches. For example, write down:  ? What you eat and drink.  ? How much sleep you get.  ? Any change to your diet or medicines.  · If you have a migraine headache:  ? Avoid things that make your symptoms worse, such as bright lights.  ? It may help to lie down in a dark, quiet room.  ? Do not drive or use heavy machinery.  ? Ask your health care provider what activities are safe for you while you are experiencing symptoms.  · Keep all follow-up visits as told by your health care provider. This is important.  Contact a health care provider if:  · You develop symptoms that are different or more severe than your usual migraine headache symptoms.  · You have more than 15 headache days in one month.  Get help right away if:  · Your migraine headache becomes severe.  · Your migraine headache lasts longer than 72 hours.  · You have a fever.  · You have a stiff neck.  · You have vision loss.  · Your muscles feel weak or like you cannot control them.  · You start to lose your balance often.  · You have trouble walking.  · You faint.  · You have a seizure.  Summary  · A migraine headache is an intense, throbbing pain on one side or both sides of the head. Migraines may also cause other symptoms, such as nausea, vomiting, and sensitivity to light and noise.  · This condition may be treated with medicines and lifestyle changes. You may also need to avoid certain things that trigger a migraine  headache.  · Keep a journal to find out what may trigger your migraine headaches.  · Contact your health care provider if you have more than 15 headache days in a month or you develop symptoms that are different or more severe than your usual migraine headache symptoms.  This information is not intended to replace advice given to you by your health care provider. Make sure you discuss any questions you have with your health care provider.  Document Released: 12/18/2006 Document Revised: 04/10/2020 Document Reviewed: 01/30/2020  [employer] Patient Education © 2020 [employer]

## 2020-08-28 LAB
Lab: NORMAL
METHYLMALONATE SERPL-SCNC: 246 NMOL/L (ref 0–378)

## 2020-12-01 ENCOUNTER — OFFICE VISIT (OUTPATIENT)
Dept: OBSTETRICS AND GYNECOLOGY | Facility: CLINIC | Age: 35
End: 2020-12-01

## 2020-12-01 VITALS
HEIGHT: 65 IN | BODY MASS INDEX: 28.32 KG/M2 | WEIGHT: 170 LBS | DIASTOLIC BLOOD PRESSURE: 68 MMHG | SYSTOLIC BLOOD PRESSURE: 112 MMHG

## 2020-12-01 DIAGNOSIS — Z01.419 ENCOUNTER FOR GYNECOLOGICAL EXAMINATION WITHOUT ABNORMAL FINDING: Primary | ICD-10-CM

## 2020-12-01 PROCEDURE — 99395 PREV VISIT EST AGE 18-39: CPT | Performed by: OBSTETRICS & GYNECOLOGY

## 2020-12-01 RX ORDER — AZITHROMYCIN 250 MG/1
TABLET, FILM COATED ORAL
COMMUNITY
Start: 2020-11-10 | End: 2020-12-02

## 2020-12-01 RX ORDER — FAMOTIDINE 40 MG/1
40 TABLET, FILM COATED ORAL
COMMUNITY
Start: 2020-09-30 | End: 2021-04-02

## 2020-12-01 RX ORDER — PREDNISONE 10 MG/1
TABLET ORAL
COMMUNITY
Start: 2020-11-10 | End: 2020-12-02

## 2020-12-01 RX ORDER — AMITRIPTYLINE HYDROCHLORIDE 25 MG/1
25 TABLET, FILM COATED ORAL NIGHTLY
COMMUNITY
Start: 2020-10-05

## 2020-12-01 RX ORDER — ONDANSETRON 8 MG/1
TABLET, ORALLY DISINTEGRATING ORAL
COMMUNITY
Start: 2020-09-30 | End: 2021-02-05

## 2020-12-01 RX ORDER — HYDROXYZINE PAMOATE 25 MG/1
25 CAPSULE ORAL 3 TIMES DAILY PRN
COMMUNITY
Start: 2020-09-30

## 2020-12-01 RX ORDER — NITROFURANTOIN 25; 75 MG/1; MG/1
CAPSULE ORAL
COMMUNITY
End: 2020-12-02

## 2020-12-01 NOTE — PROGRESS NOTES
Subjective   Chief Complaint   Patient presents with   • Gynecologic Exam     Annual, last pap  WNL, LMP 2020. Patient states she has been having left ovarian pain      Tammi Collado is a 35 y.o. year old  presenting to be seen for her annual exam.     SEXUAL Hx:  She is currently sexually active.  In the past year there has not been new sexual partners.    Condoms are not typically used.  She would not like to be screened for STD's at today's exam.  Current birth control method: not using any form of contraception and does not wish to get pregnant.  MENSTRUAL Hx:  Patient's last menstrual period was 2020 (approximate).  In the past 6 months her cycles have been absent.   Her menstrual flow has been absent.   Each month on average there are roughly 0 days of very heavy flow.    Intermenstrual bleeding is absent.    Post-coital bleeding is absent.  Dysmenorrhea: is not affecting her activities of daily living  PMS: is not affecting her activities of daily living  Her cycles are not a source of concern for her that she wishes to discuss today.  HEALTH Hx:  She exercises regularly: no (and has no plans to become more active).  She wears her seat belt:yes.  She has concerns about domestic violence: no.  OTHER COMPLAINTS:  Nothing else    The following portions of the patient's history were reviewed and updated as appropriate:  She  has a past medical history of Acid reflux, Allergy, Anxiety, Arthritis, Asthma, Bladder incontinence, Constipation, Cough, Depression, Depression, Difficulty walking, Kidney stones, Migraine, Pain with urination, and Urinary frequency.  She does not have a problem list on file.  She  has a past surgical history that includes Kidney stone surgery () and Oophorectomy (Right, 2009).  Her family history includes Cystic fibrosis in her maternal aunt; Diabetes in her paternal grandmother; Hyperlipidemia in her paternal grandmother; Hypertension in her paternal  grandmother; Ovarian cancer in her mother and paternal grandmother; Stroke in her paternal grandmother.  She  reports that she has been smoking. She has been smoking about 1.00 pack per day. She has never used smokeless tobacco. She reports that she does not drink alcohol or use drugs.  Current Outpatient Medications   Medication Sig Dispense Refill   • amitriptyline (ELAVIL) 10 MG tablet Take 25 mg by mouth Every Night.  0   • butalbital-acetaminophen-caffeine (FIORICET, ESGIC) -40 MG per tablet take 1 tablet by mouth every 4 hours if needed  0   • clonazePAM (KlonoPIN) 1 MG tablet   0   • cyclobenzaprine (FLEXERIL) 10 MG tablet take 1 tablet by mouth at bedtime  0   • EMGALITY 120 MG/ML prefilled syringe Inject 1 mL under the skin into the appropriate area as directed Every 30 (Thirty) Days. 1.12 mL 3   • EPINEPHrine (EPIPEN) 0.3 MG/0.3ML solution auto-injector injection epinephrine 0.3 mg/0.3 mL injection, auto-injector     • ibuprofen (ADVIL,MOTRIN) 800 MG tablet Take 800 mg by mouth 3 (Three) Times a Day.  0   • montelukast (SINGULAIR) 10 MG tablet TAKE ONE TABLET BY MOUTH ONCE A DAY  0   • ondansetron (ZOFRAN) 4 MG tablet take 1 tablet by mouth every 6 hours if needed  0   • PROAIR  (90 Base) MCG/ACT inhaler inhale 2 puffs by mouth every 4 to 6 hours if needed  0   • SUMAtriptan (IMITREX) 100 MG tablet   0   • venlafaxine XR (EFFEXOR-XR) 150 MG 24 hr capsule Take 150 mg by mouth Daily.  0   • amitriptyline (ELAVIL) 25 MG tablet TK 1 T PO QHS     • azithromycin (ZITHROMAX) 250 MG tablet      • cyproheptadine (PERIACTIN) 4 MG tablet Take 1 tablet by mouth Every Evening. 30 tablet 2   • famotidine (PEPCID) 40 MG tablet TK 1 T PO QPM     • hydrOXYzine pamoate (VISTARIL) 25 MG capsule TK 1 C PO BID PRA     • nitrofurantoin, macrocrystal-monohydrate, (MACROBID) 100 MG capsule nitrofurantoin monohydrate/macrocrystals 100 mg capsule     • ondansetron ODT (ZOFRAN-ODT) 8 MG disintegrating tablet DIS 1 T ON  THE TONGUE Q 8 H PRF NAUSEA OR VOM     • predniSONE (DELTASONE) 10 MG tablet TK 1 T PO TID FOR 2 DAYS 1 T BID FOR 2 DAYS THEN 1 T QD FOR 2 DAYS       No current facility-administered medications for this visit.      Current Outpatient Medications on File Prior to Visit   Medication Sig   • amitriptyline (ELAVIL) 10 MG tablet Take 25 mg by mouth Every Night.   • butalbital-acetaminophen-caffeine (FIORICET, ESGIC) -40 MG per tablet take 1 tablet by mouth every 4 hours if needed   • clonazePAM (KlonoPIN) 1 MG tablet    • cyclobenzaprine (FLEXERIL) 10 MG tablet take 1 tablet by mouth at bedtime   • EMGALITY 120 MG/ML prefilled syringe Inject 1 mL under the skin into the appropriate area as directed Every 30 (Thirty) Days.   • EPINEPHrine (EPIPEN) 0.3 MG/0.3ML solution auto-injector injection epinephrine 0.3 mg/0.3 mL injection, auto-injector   • ibuprofen (ADVIL,MOTRIN) 800 MG tablet Take 800 mg by mouth 3 (Three) Times a Day.   • montelukast (SINGULAIR) 10 MG tablet TAKE ONE TABLET BY MOUTH ONCE A DAY   • ondansetron (ZOFRAN) 4 MG tablet take 1 tablet by mouth every 6 hours if needed   • PROAIR  (90 Base) MCG/ACT inhaler inhale 2 puffs by mouth every 4 to 6 hours if needed   • SUMAtriptan (IMITREX) 100 MG tablet    • venlafaxine XR (EFFEXOR-XR) 150 MG 24 hr capsule Take 150 mg by mouth Daily.   • amitriptyline (ELAVIL) 25 MG tablet TK 1 T PO QHS   • azithromycin (ZITHROMAX) 250 MG tablet    • cyproheptadine (PERIACTIN) 4 MG tablet Take 1 tablet by mouth Every Evening.   • famotidine (PEPCID) 40 MG tablet TK 1 T PO QPM   • hydrOXYzine pamoate (VISTARIL) 25 MG capsule TK 1 C PO BID PRA   • nitrofurantoin, macrocrystal-monohydrate, (MACROBID) 100 MG capsule nitrofurantoin monohydrate/macrocrystals 100 mg capsule   • ondansetron ODT (ZOFRAN-ODT) 8 MG disintegrating tablet DIS 1 T ON THE TONGUE Q 8 H PRF NAUSEA OR VOM   • predniSONE (DELTASONE) 10 MG tablet TK 1 T PO TID FOR 2 DAYS 1 T BID FOR 2 DAYS THEN 1 T  "QD FOR 2 DAYS     No current facility-administered medications on file prior to visit.      She is allergic to levofloxacin; peanut (diagnostic); penicillins; adhesive tape; and shellfish allergy..    Social History    Tobacco Use      Smoking status: Current Every Day Smoker        Packs/day: 1.00      Smokeless tobacco: Never Used    Review of Systems  Consitutional NEG: anorexia or night sweats    POS: nothing reported   Gastointestinal NEG: bloating, change in bowel habits, melena or reflux symptoms    POS: nothing reported   Genitourinary NEG: dysuria or hematuria    POS: nothing reported   Integument NEG: moles that are changing in size, shape, color or rashes    POS: nothing reported   Breast NEG: persistent breast lump, skin dimpling or nipple discharge    POS: nothing reported          Objective   /68   Ht 165.1 cm (65\")   Wt 77.1 kg (170 lb)   LMP 11/24/2020 (Approximate)   Breastfeeding No   BMI 28.29 kg/m²     General:  well developed; well nourished  no acute distress   Skin:  No suspicious lesions seen   Thyroid: normal to inspection and palpation   Breasts:  Examined in supine position  Symmetric without masses or skin dimpling  Nipples normal without inversion, lesions or discharge  There are no palpable axillary nodes   Abdomen: soft, non-tender; no masses  no umbilical or inguinal hernias are present  no hepato-splenomegaly   Pelvis: Clinical staff was present for exam  External genitalia:  normal appearance of the external genitalia including Bartholin's and Iyanbito's glands.  :  urethral meatus normal;  Vaginal:  normal pink mucosa without prolapse or lesions.  Cervix:  normal appearance.  Uterus:  normal size, shape and consistency.  Adnexa:  normal bimanual exam of the adnexa.  Rectal:  digital rectal exam not performed; anus visually normal appearing.        Assessment   1. Normal PE     Plan   1. PAP done  2. Menstraul Migraines-- did not tolerate extended dose OCP-- seeing " Neurology--treatment options from neurology been exhausted per patient.  She strongly desired definitive management given the cyclic and severe nature of these.  They are interfering with her work and home life.  3. Follow up 2 months    No orders of the defined types were placed in this encounter.         This note was electronically signed.      December 1, 2020

## 2020-12-02 ENCOUNTER — OFFICE VISIT (OUTPATIENT)
Dept: NEUROLOGY | Facility: CLINIC | Age: 35
End: 2020-12-02

## 2020-12-02 DIAGNOSIS — G43.719 INTRACTABLE CHRONIC MIGRAINE WITHOUT AURA AND WITHOUT STATUS MIGRAINOSUS: ICD-10-CM

## 2020-12-02 DIAGNOSIS — F17.210 CONTINUOUS DEPENDENCE ON CIGARETTE SMOKING: Primary | ICD-10-CM

## 2020-12-02 PROCEDURE — 99442 PR PHYS/QHP TELEPHONE EVALUATION 11-20 MIN: CPT | Performed by: NURSE PRACTITIONER

## 2020-12-02 RX ORDER — CYPROHEPTADINE HYDROCHLORIDE 4 MG/1
4 TABLET ORAL EVERY EVENING
Qty: 30 TABLET | Refills: 2 | Status: SHIPPED | OUTPATIENT
Start: 2020-12-02 | End: 2021-04-02

## 2020-12-02 NOTE — PATIENT INSTRUCTIONS
You have been prescribed an as-needed migraine medication called Ubrelvy. You may take one pill at the start of the migraine. If this doesn't help enough, you may take 1 more tablet after 2 hours. Do not take more than 2 tablets in 24 hours. Do not take with other migraine medications such as Imitrex.

## 2020-12-02 NOTE — PROGRESS NOTES
Follow Up Neurology Office Visit      Patient Name: Tammi Collado    Referring Physician: No ref. provider found    Chief Complaint:    Chief Complaint   Patient presents with   • Follow-up   • Migraine   • Med Refill     having hard time getting med refilled that was given back in Aug.       History of Present Illness: Tammi Collado is a 35 y.o. female who is here to follow up with Neurology for  Migraine headaches. She continues to have persistent daily headaches and frequent migraine episodes. Estimates 8-11 episodes migraine in past month, episodes may last several days. She typically takes 2 Advil and Imitrex for relief. She states that her headaches typically increase around her menses, although she does experience them at other times of the month.    The following portions of the patient's history were reviewed and updated as appropriate: allergies, current medications, past family history, past medical history, past social history, past surgical history and problem list.    Subjective     Review of Systems:   Review of Systems   Eyes: Negative.    Cardiovascular: Negative.    Gastrointestinal: Positive for constipation.   Genitourinary: Positive for menstrual problem.   Skin: Negative.    Allergic/Immunologic: Positive for food allergies.   Neurological: Positive for headache.   Hematological: Negative.    Psychiatric/Behavioral: The patient is nervous/anxious.      Medications:     Current Outpatient Medications:   •  amitriptyline (ELAVIL) 25 MG tablet, TK 1 T PO QHS, Disp: , Rfl:   •  butalbital-acetaminophen-caffeine (FIORICET, ESGIC) -40 MG per tablet, take 1 tablet by mouth every 4 hours if needed, Disp: , Rfl: 0  •  clonazePAM (KlonoPIN) 1 MG tablet, , Disp: , Rfl: 0  •  cyclobenzaprine (FLEXERIL) 10 MG tablet, take 1 tablet by mouth at bedtime, Disp: , Rfl: 0  •  cyproheptadine (PERIACTIN) 4 MG tablet, Take 1 tablet by mouth Every Evening., Disp: 30 tablet, Rfl: 2  •  EMGALITY 120 MG/ML  prefilled syringe, Inject 1 mL under the skin into the appropriate area as directed Every 30 (Thirty) Days., Disp: 1.12 mL, Rfl: 3  •  EPINEPHrine (EPIPEN) 0.3 MG/0.3ML solution auto-injector injection, epinephrine 0.3 mg/0.3 mL injection, auto-injector, Disp: , Rfl:   •  famotidine (PEPCID) 40 MG tablet, TK 1 T PO QPM, Disp: , Rfl:   •  hydrOXYzine pamoate (VISTARIL) 25 MG capsule, TK 1 C PO BID PRA, Disp: , Rfl:   •  ibuprofen (ADVIL,MOTRIN) 800 MG tablet, Take 800 mg by mouth 3 (Three) Times a Day., Disp: , Rfl: 0  •  montelukast (SINGULAIR) 10 MG tablet, TAKE ONE TABLET BY MOUTH ONCE A DAY, Disp: , Rfl: 0  •  ondansetron (ZOFRAN) 4 MG tablet, take 1 tablet by mouth every 6 hours if needed, Disp: , Rfl: 0  •  ondansetron ODT (ZOFRAN-ODT) 8 MG disintegrating tablet, DIS 1 T ON THE TONGUE Q 8 H PRF NAUSEA OR VOM, Disp: , Rfl:   •  PROAIR  (90 Base) MCG/ACT inhaler, inhale 2 puffs by mouth every 4 to 6 hours if needed, Disp: , Rfl: 0  •  SUMAtriptan (IMITREX) 100 MG tablet, , Disp: , Rfl: 0  •  venlafaxine XR (EFFEXOR-XR) 150 MG 24 hr capsule, Take 150 mg by mouth Daily., Disp: , Rfl: 0  •  ubrogepant (ubrogepant) 100 MG tablet, Take 1 tablet by mouth 1 (One) Time As Needed (migraine) for up to 2 doses., Disp: 10 tablet, Rfl: 2    Allergies:   Allergies   Allergen Reactions   • Levofloxacin Hives   • Peanut (Diagnostic)    • Penicillins    • Adhesive Tape Rash   • Shellfish Allergy Rash     Objective     Physical Exam:limited due to telephone encounter  Vital Signs: There were no vitals filed for this visit.    Physical Exam  Neurological:      Mental Status: She is oriented to person, place, and time.   Psychiatric:         Speech: Speech is tangential.       Neurologic Exam     Mental Status   Oriented to person, place, and time.     Results Review:   I reviewed the patient's new clinical results.  I have reviewed the patient's other medical records to include, labs, radiology and referrals.     Assessment  / Plan      Assessment/Plan:   Diagnoses and all orders for this visit:     Intractable chronic migraine without aura and without status migrainosus  -     cyproheptadine (PERIACTIN) 4 MG tablet; Take 1 tablet by mouth Every Evening.  Dispense: 30 tablet; Refill: 2  -     ubrogepant (ubrogepant) 100 MG tablet; Take 1 tablet by mouth 1 (One) Time As Needed (migraine) for up to 2 doses.  Dispense: 10 tablet; Refill: 2  Patient has been experiencing persistent daily headaches, and estimates 8-11 migraine episodes per month.  She is interested in Botox for reduction of migraine episodes as well as relief of chronic daily headache.  I have discussed with patient that she will need to stop Emgality if she desires to initiate Botox therapy.  She is in agreement with this plan and would like to proceed with Botox injection for migraine prevention.  She is an active daily smoker, and I have discussed concerns regarding triptan use with patient. I discussed -gepant treatment options with patient, she will begin Ubrelvy for acute migraine treatment.   This patient experiences 30/30 headache days per month with at least 8 days being severe migraine headaches. Migraine headaches last >4hour/day and have been present for greater than 6 consecutive months. Characteristics of migraines include: unilateral, pulsating, moderate to severe intensity, worsened by physical exertion, photophobia, and nausea and/or vomiting. Patient has tried and failed medications for migraine prevention for 3 months or greater: Topamax, Amitriptyline, Propranolol, Treximet, Maxalt, SSRIs, BBs, AEDs, NSAIDs, Muscle Relaxers, and Tylenol. I have recommended Botox using FDA approved protocol for chronic daily headache and migraine prevention resistant to prior regimens as listed above. We have discussed risk and benefits of this procedure and common side effects including headache, neck pain, neck stiffness or weakness, ptosis, flu-like symptoms as well as  more serious possible adverse effects including possible dysphagia, respiratory distress or even death (death has only been reported once with adults for Botox for migraines in another state when mixed with lidocaine solution which we do not use lidocaine solution in our practice for mixing Botox). Verbalizes understanding, accepts risks and agrees with moving forward with Botox injections for chronic migraine prevention.     Continuous dependence on cigarette smoking  Patient expresses concerns about allergies and headaches today. I have again discussed with her the need for smoking cessation, as this will improve all aspects of her health. She perseverates at length about reluctance to stop smoking, including constipation with previous cessation. I have informed patient that it would be better to use bowel management regimen of fiber, stool softener, and increased water instead of cigarettes to promote bowel movements. She is reluctant to quit, and declined tobacco cessation aids at this time.      Follow Up:   Return in about 8 weeks (around 1/27/2021) for Next scheduled follow up.     Soniya Marie, APRANNITA  [facility]     Please note that portions of this note may have been completed with a voice recognition program. Efforts were made to edit the dictations, but occasionally words are mistranscribed.  You have chosen to receive care through a telephone visit. Do you consent to use a telephone visit for your medical care today? YES  This visit has been rescheduled as a phone visit to comply with patient safety concerns in accordance with CDC recommendations. Total time of discussion was 16 minutes.

## 2020-12-04 ENCOUNTER — TELEPHONE (OUTPATIENT)
Dept: NEUROLOGY | Facility: CLINIC | Age: 35
End: 2020-12-04

## 2020-12-04 NOTE — TELEPHONE ENCOUNTER
PT CALLING STATING THAT THE [facility] TOLD HER THAT THE NEW MEDICATION  ubrogepant (ubrogepant) NEEDS A PA.

## 2020-12-07 ENCOUNTER — TELEPHONE (OUTPATIENT)
Dept: NEUROLOGY | Facility: CLINIC | Age: 35
End: 2020-12-07

## 2020-12-07 NOTE — TELEPHONE ENCOUNTER
Attempted to contact patient to let her know I have started a PA on patient Ubrelvy per cover my meds. Patient has other Primary coverage.     Need to know if patient has any other insurance besides [place] Medicaid.     No answer when calling, Left message for patient to return.

## 2020-12-10 DIAGNOSIS — Z01.419 ENCOUNTER FOR GYNECOLOGICAL EXAMINATION WITHOUT ABNORMAL FINDING: ICD-10-CM

## 2020-12-18 ENCOUNTER — TELEPHONE (OUTPATIENT)
Dept: NEUROLOGY | Facility: CLINIC | Age: 35
End: 2020-12-18

## 2020-12-31 ENCOUNTER — TELEPHONE (OUTPATIENT)
Dept: NEUROLOGY | Facility: CLINIC | Age: 35
End: 2020-12-31

## 2021-01-05 ENCOUNTER — TELEPHONE (OUTPATIENT)
Dept: NEUROLOGY | Facility: CLINIC | Age: 36
End: 2021-01-05

## 2021-01-06 ENCOUNTER — OFFICE VISIT (OUTPATIENT)
Dept: OBSTETRICS AND GYNECOLOGY | Facility: CLINIC | Age: 36
End: 2021-01-06

## 2021-01-06 VITALS
DIASTOLIC BLOOD PRESSURE: 72 MMHG | SYSTOLIC BLOOD PRESSURE: 122 MMHG | BODY MASS INDEX: 28.99 KG/M2 | WEIGHT: 174 LBS | HEIGHT: 65 IN

## 2021-01-06 DIAGNOSIS — N93.9 ABNORMAL UTERINE BLEEDING (AUB): ICD-10-CM

## 2021-01-06 DIAGNOSIS — R10.2 PELVIC PAIN: ICD-10-CM

## 2021-01-06 DIAGNOSIS — G43.831 MENSTRUAL MIGRAINE, WITH INTRACTABLE MIGRAINE, SO STATED, WITH STATUS MIGRAINOSUS: Primary | ICD-10-CM

## 2021-01-06 PROCEDURE — 99213 OFFICE O/P EST LOW 20 MIN: CPT | Performed by: OBSTETRICS & GYNECOLOGY

## 2021-01-06 RX ORDER — PHENAZOPYRIDINE HYDROCHLORIDE 100 MG/1
200 TABLET, FILM COATED ORAL ONCE
Status: CANCELLED | OUTPATIENT
Start: 2021-01-06 | End: 2021-01-06

## 2021-01-06 RX ORDER — SODIUM CHLORIDE 0.9 % (FLUSH) 0.9 %
10 SYRINGE (ML) INJECTION AS NEEDED
Status: CANCELLED | OUTPATIENT
Start: 2021-01-06

## 2021-01-06 RX ORDER — SODIUM CHLORIDE 0.9 % (FLUSH) 0.9 %
3 SYRINGE (ML) INJECTION EVERY 12 HOURS SCHEDULED
Status: CANCELLED | OUTPATIENT
Start: 2021-01-06

## 2021-01-06 NOTE — PROGRESS NOTES
Subjective   Chief Complaint   Patient presents with   • Follow-up     Patient states she has been spotting since pap smear, and left side pain, wouls like to discuss surgery     Tammi Collado is a 35 y.o. year old .  Patient's last menstrual period was 12/10/2020 (approximate).  She presents to be seen because of  AUB, pelvic pain, menstrual migraines.. Patient's had persistent bleeding since last Pap smear-Pap smear was normal.  She is also having left lower quadrant pain.  She has known history of severe menstrual migraines and has been intolerant to hormonal management and desires definitive management.    OTHER COMPLAINTS:  Nothing else    The following portions of the patient's history were reviewed and updated as appropriate:  She  has a past medical history of Acid reflux, Allergy, Anxiety, Arthritis, Asthma, Bladder incontinence, Constipation, Cough, Depression, Depression, Difficulty walking, Kidney stones, Migraine, Pain with urination, and Urinary frequency.  She does not have a problem list on file.  She  has a past surgical history that includes Kidney stone surgery () and Oophorectomy (Right, ).  Her family history includes Cystic fibrosis in her maternal aunt; Diabetes in her paternal grandmother; Hyperlipidemia in her paternal grandmother; Hypertension in her paternal grandmother; Ovarian cancer in her mother and paternal grandmother; Stroke in her paternal grandmother.  She  reports that she has been smoking cigarettes. She has been smoking about 1.00 pack per day. She has never used smokeless tobacco. She reports that she does not drink alcohol or use drugs.  Current Outpatient Medications   Medication Sig Dispense Refill   • amitriptyline (ELAVIL) 25 MG tablet TK 1 T PO QHS     • butalbital-acetaminophen-caffeine (FIORICET, ESGIC) -40 MG per tablet take 1 tablet by mouth every 4 hours if needed  0   • clonazePAM (KlonoPIN) 1 MG tablet   0   • cyclobenzaprine (FLEXERIL) 10 MG  tablet take 1 tablet by mouth at bedtime  0   • cyproheptadine (PERIACTIN) 4 MG tablet Take 1 tablet by mouth Every Evening. 30 tablet 2   • EMGALITY 120 MG/ML prefilled syringe Inject 1 mL under the skin into the appropriate area as directed Every 30 (Thirty) Days. 1.12 mL 3   • EPINEPHrine (EPIPEN) 0.3 MG/0.3ML solution auto-injector injection epinephrine 0.3 mg/0.3 mL injection, auto-injector     • famotidine (PEPCID) 40 MG tablet TK 1 T PO QPM     • hydrOXYzine pamoate (VISTARIL) 25 MG capsule TK 1 C PO BID PRA     • ibuprofen (ADVIL,MOTRIN) 800 MG tablet Take 800 mg by mouth 3 (Three) Times a Day.  0   • montelukast (SINGULAIR) 10 MG tablet TAKE ONE TABLET BY MOUTH ONCE A DAY  0   • ondansetron (ZOFRAN) 4 MG tablet take 1 tablet by mouth every 6 hours if needed  0   • ondansetron ODT (ZOFRAN-ODT) 8 MG disintegrating tablet DIS 1 T ON THE TONGUE Q 8 H PRF NAUSEA OR VOM     • PROAIR  (90 Base) MCG/ACT inhaler inhale 2 puffs by mouth every 4 to 6 hours if needed  0   • SUMAtriptan (IMITREX) 100 MG tablet   0   • ubrogepant (ubrogepant) 100 MG tablet Take 1 tablet by mouth 1 (One) Time As Needed (migraine) for up to 2 doses. 10 tablet 2   • venlafaxine XR (EFFEXOR-XR) 150 MG 24 hr capsule Take 150 mg by mouth Daily.  0     No current facility-administered medications for this visit.      Current Outpatient Medications on File Prior to Visit   Medication Sig   • amitriptyline (ELAVIL) 25 MG tablet TK 1 T PO QHS   • butalbital-acetaminophen-caffeine (FIORICET, ESGIC) -40 MG per tablet take 1 tablet by mouth every 4 hours if needed   • clonazePAM (KlonoPIN) 1 MG tablet    • cyclobenzaprine (FLEXERIL) 10 MG tablet take 1 tablet by mouth at bedtime   • cyproheptadine (PERIACTIN) 4 MG tablet Take 1 tablet by mouth Every Evening.   • EMGALITY 120 MG/ML prefilled syringe Inject 1 mL under the skin into the appropriate area as directed Every 30 (Thirty) Days.   • EPINEPHrine (EPIPEN) 0.3 MG/0.3ML solution  "auto-injector injection epinephrine 0.3 mg/0.3 mL injection, auto-injector   • famotidine (PEPCID) 40 MG tablet TK 1 T PO QPM   • hydrOXYzine pamoate (VISTARIL) 25 MG capsule TK 1 C PO BID PRA   • ibuprofen (ADVIL,MOTRIN) 800 MG tablet Take 800 mg by mouth 3 (Three) Times a Day.   • montelukast (SINGULAIR) 10 MG tablet TAKE ONE TABLET BY MOUTH ONCE A DAY   • ondansetron (ZOFRAN) 4 MG tablet take 1 tablet by mouth every 6 hours if needed   • ondansetron ODT (ZOFRAN-ODT) 8 MG disintegrating tablet DIS 1 T ON THE TONGUE Q 8 H PRF NAUSEA OR VOM   • PROAIR  (90 Base) MCG/ACT inhaler inhale 2 puffs by mouth every 4 to 6 hours if needed   • SUMAtriptan (IMITREX) 100 MG tablet    • ubrogepant (ubrogepant) 100 MG tablet Take 1 tablet by mouth 1 (One) Time As Needed (migraine) for up to 2 doses.   • venlafaxine XR (EFFEXOR-XR) 150 MG 24 hr capsule Take 150 mg by mouth Daily.     No current facility-administered medications on file prior to visit.      She is allergic to levofloxacin; peanut (diagnostic); penicillins; adhesive tape; and shellfish allergy.    Social History    Tobacco Use      Smoking status: Current Every Day Smoker        Packs/day: 1.00        Types: Cigarettes      Smokeless tobacco: Never Used    Review of Systems  Consitutional POS: nothing reported    NEG: anorexia or night sweats   Gastointestinal POS: nothing reported    NEG: bloating, change in bowel habits, melena or reflux symptoms   Genitourinary POS: nothing reported    NEG: dysuria or hematuria   Integument POS: nothing reported    NEG: moles that are changing in size, shape, color or rashes   Breast POS: nothing reported    NEG: persistent breast lump, skin dimpling or nipple discharge         Respiratory: negative  Cardiovascular: negative          Objective   /72   Ht 165.1 cm (65\")   Wt 78.9 kg (174 lb)   LMP 12/10/2020 (Approximate)   BMI 28.96 kg/m²     General:  well developed; well nourished  no acute distress   Skin:  No " suspicious lesions seen   Thyroid: normal to inspection and palpation   Lungs:  breathing is unlabored  clear to auscultation bilaterally   Heart:  Not performed.   Breasts:  Not performed.   Abdomen: soft, non-tender; no masses  no umbilical or inguinal hernias are present  no hepato-splenomegaly   Pelvis: Not performed.     Psychiatric: Alert and oriented ×3, mood and affect appropriate  HEENT: Atraumatic, normocephalic, normal scleral icterus  Extremities: 2+ pulses bilaterally, no edema      Lab Review   PAP smear    Imaging   No data reviewed        Assessment   1. AUB  2. Pelvic pain  3. Severe menstrual migraines and intoelrance to hormonal mgmt     Plan   1. TLH/LSO/Cystoscopy  2. R/B A    No orders of the defined types were placed in this encounter.         This note was electronically signed.      January 6, 2021

## 2021-01-18 ENCOUNTER — TELEPHONE (OUTPATIENT)
Dept: OBSTETRICS AND GYNECOLOGY | Facility: CLINIC | Age: 36
End: 2021-01-18

## 2021-01-25 ENCOUNTER — APPOINTMENT (OUTPATIENT)
Dept: PREADMISSION TESTING | Facility: HOSPITAL | Age: 36
End: 2021-01-25

## 2021-02-05 ENCOUNTER — APPOINTMENT (OUTPATIENT)
Dept: ULTRASOUND IMAGING | Facility: HOSPITAL | Age: 36
End: 2021-02-05

## 2021-02-05 ENCOUNTER — HOSPITAL ENCOUNTER (EMERGENCY)
Facility: HOSPITAL | Age: 36
Discharge: HOME | End: 2021-02-05
Attending: EMERGENCY MEDICINE | Admitting: EMERGENCY MEDICINE

## 2021-02-05 ENCOUNTER — APPOINTMENT (OUTPATIENT)
Dept: CT IMAGING | Facility: HOSPITAL | Age: 36
End: 2021-02-05

## 2021-02-05 VITALS
HEIGHT: 65 IN | HEART RATE: 87 BPM | RESPIRATION RATE: 18 BRPM | OXYGEN SATURATION: 99 % | BODY MASS INDEX: 29.16 KG/M2 | DIASTOLIC BLOOD PRESSURE: 92 MMHG | WEIGHT: 175 LBS | SYSTOLIC BLOOD PRESSURE: 126 MMHG | TEMPERATURE: 99.2 F

## 2021-02-05 DIAGNOSIS — R10.32 LEFT LOWER QUADRANT ABDOMINAL PAIN: Primary | ICD-10-CM

## 2021-02-05 DIAGNOSIS — N83.202 CYST OF LEFT OVARY: ICD-10-CM

## 2021-02-05 LAB
ALBUMIN SERPL-MCNC: 4.3 G/DL (ref 3.5–5.2)
ALBUMIN/GLOB SERPL: 1.5 G/DL
ALP SERPL-CCNC: 89 U/L (ref 39–117)
ALT SERPL W P-5'-P-CCNC: 15 U/L (ref 1–33)
ANION GAP SERPL CALCULATED.3IONS-SCNC: 9.8 MMOL/L (ref 5–15)
AST SERPL-CCNC: 19 U/L (ref 1–32)
B-HCG UR QL: NEGATIVE
BACTERIA UR QL AUTO: ABNORMAL /HPF
BASOPHILS # BLD AUTO: 0.08 10*3/MM3 (ref 0–0.2)
BASOPHILS NFR BLD AUTO: 0.9 % (ref 0–1.5)
BILIRUB SERPL-MCNC: 0.3 MG/DL (ref 0–1.2)
BILIRUB UR QL STRIP: NEGATIVE
BUN SERPL-MCNC: 12 MG/DL (ref 6–20)
BUN/CREAT SERPL: 14.8 (ref 7–25)
CALCIUM SPEC-SCNC: 9.5 MG/DL (ref 8.6–10.5)
CHLORIDE SERPL-SCNC: 106 MMOL/L (ref 98–107)
CLARITY UR: CLEAR
CO2 SERPL-SCNC: 23.2 MMOL/L (ref 22–29)
COLOR UR: YELLOW
CREAT SERPL-MCNC: 0.81 MG/DL (ref 0.57–1)
DEPRECATED RDW RBC AUTO: 41.7 FL (ref 37–54)
EOSINOPHIL # BLD AUTO: 0.08 10*3/MM3 (ref 0–0.4)
EOSINOPHIL NFR BLD AUTO: 0.9 % (ref 0.3–6.2)
ERYTHROCYTE [DISTWIDTH] IN BLOOD BY AUTOMATED COUNT: 12.2 % (ref 12.3–15.4)
GFR SERPL CREATININE-BSD FRML MDRD: 81 ML/MIN/1.73
GLOBULIN UR ELPH-MCNC: 2.8 GM/DL
GLUCOSE SERPL-MCNC: 116 MG/DL (ref 65–99)
GLUCOSE UR STRIP-MCNC: NEGATIVE MG/DL
HCT VFR BLD AUTO: 45.4 % (ref 34–46.6)
HGB BLD-MCNC: 15.7 G/DL (ref 12–15.9)
HGB UR QL STRIP.AUTO: ABNORMAL
IMM GRANULOCYTES # BLD AUTO: 0.03 10*3/MM3 (ref 0–0.05)
IMM GRANULOCYTES NFR BLD AUTO: 0.4 % (ref 0–0.5)
KETONES UR QL STRIP: NEGATIVE
LEUKOCYTE ESTERASE UR QL STRIP.AUTO: ABNORMAL
LIPASE SERPL-CCNC: 25 U/L (ref 13–60)
LYMPHOCYTES # BLD AUTO: 1.8 10*3/MM3 (ref 0.7–3.1)
LYMPHOCYTES NFR BLD AUTO: 21 % (ref 19.6–45.3)
MCH RBC QN AUTO: 31.9 PG (ref 26.6–33)
MCHC RBC AUTO-ENTMCNC: 34.6 G/DL (ref 31.5–35.7)
MCV RBC AUTO: 92.3 FL (ref 79–97)
MONOCYTES # BLD AUTO: 0.51 10*3/MM3 (ref 0.1–0.9)
MONOCYTES NFR BLD AUTO: 6 % (ref 5–12)
NEUTROPHILS NFR BLD AUTO: 6.07 10*3/MM3 (ref 1.7–7)
NEUTROPHILS NFR BLD AUTO: 70.8 % (ref 42.7–76)
NITRITE UR QL STRIP: NEGATIVE
NRBC BLD AUTO-RTO: 0 /100 WBC (ref 0–0.2)
PH UR STRIP.AUTO: 6 [PH] (ref 5–8)
PLATELET # BLD AUTO: 235 10*3/MM3 (ref 140–450)
PMV BLD AUTO: 9.9 FL (ref 6–12)
POTASSIUM SERPL-SCNC: 4.3 MMOL/L (ref 3.5–5.2)
PROT SERPL-MCNC: 7.1 G/DL (ref 6–8.5)
PROT UR QL STRIP: NEGATIVE
RBC # BLD AUTO: 4.92 10*6/MM3 (ref 3.77–5.28)
RBC # UR: ABNORMAL /HPF
REF LAB TEST METHOD: ABNORMAL
SODIUM SERPL-SCNC: 139 MMOL/L (ref 136–145)
SP GR UR STRIP: 1.01 (ref 1–1.03)
SQUAMOUS #/AREA URNS HPF: ABNORMAL /HPF
UROBILINOGEN UR QL STRIP: ABNORMAL
WBC # BLD AUTO: 8.57 10*3/MM3 (ref 3.4–10.8)
WBC UR QL AUTO: ABNORMAL /HPF

## 2021-02-05 PROCEDURE — 96374 THER/PROPH/DIAG INJ IV PUSH: CPT

## 2021-02-05 PROCEDURE — 25010000002 ONDANSETRON PER 1 MG: Performed by: EMERGENCY MEDICINE

## 2021-02-05 PROCEDURE — 85025 COMPLETE CBC W/AUTO DIFF WBC: CPT | Performed by: EMERGENCY MEDICINE

## 2021-02-05 PROCEDURE — 96375 TX/PRO/DX INJ NEW DRUG ADDON: CPT

## 2021-02-05 PROCEDURE — 81025 URINE PREGNANCY TEST: CPT | Performed by: EMERGENCY MEDICINE

## 2021-02-05 PROCEDURE — 87086 URINE CULTURE/COLONY COUNT: CPT | Performed by: EMERGENCY MEDICINE

## 2021-02-05 PROCEDURE — 25010000002 IOPAMIDOL 61 % SOLUTION: Performed by: EMERGENCY MEDICINE

## 2021-02-05 PROCEDURE — 99284 EMERGENCY DEPT VISIT MOD MDM: CPT

## 2021-02-05 PROCEDURE — 80053 COMPREHEN METABOLIC PANEL: CPT | Performed by: EMERGENCY MEDICINE

## 2021-02-05 PROCEDURE — 74177 CT ABD & PELVIS W/CONTRAST: CPT

## 2021-02-05 PROCEDURE — 87147 CULTURE TYPE IMMUNOLOGIC: CPT | Performed by: EMERGENCY MEDICINE

## 2021-02-05 PROCEDURE — 25010000002 KETOROLAC TROMETHAMINE PER 15 MG: Performed by: EMERGENCY MEDICINE

## 2021-02-05 PROCEDURE — 25010000002 DROPERIDOL PER 5 MG: Performed by: EMERGENCY MEDICINE

## 2021-02-05 PROCEDURE — 81001 URINALYSIS AUTO W/SCOPE: CPT | Performed by: EMERGENCY MEDICINE

## 2021-02-05 PROCEDURE — 83690 ASSAY OF LIPASE: CPT | Performed by: EMERGENCY MEDICINE

## 2021-02-05 PROCEDURE — 76830 TRANSVAGINAL US NON-OB: CPT

## 2021-02-05 RX ORDER — KETOROLAC TROMETHAMINE 30 MG/ML
30 INJECTION, SOLUTION INTRAMUSCULAR; INTRAVENOUS ONCE
Status: COMPLETED | OUTPATIENT
Start: 2021-02-05 | End: 2021-02-05

## 2021-02-05 RX ORDER — DROPERIDOL 2.5 MG/ML
2.5 INJECTION, SOLUTION INTRAMUSCULAR; INTRAVENOUS ONCE
Status: COMPLETED | OUTPATIENT
Start: 2021-02-05 | End: 2021-02-05

## 2021-02-05 RX ORDER — ONDANSETRON 4 MG/1
4 TABLET, ORALLY DISINTEGRATING ORAL EVERY 6 HOURS PRN
Qty: 10 TABLET | Refills: 0 | Status: SHIPPED | OUTPATIENT
Start: 2021-02-05

## 2021-02-05 RX ORDER — ONDANSETRON 2 MG/ML
4 INJECTION INTRAMUSCULAR; INTRAVENOUS ONCE
Status: COMPLETED | OUTPATIENT
Start: 2021-02-05 | End: 2021-02-05

## 2021-02-05 RX ORDER — KETOROLAC TROMETHAMINE 10 MG/1
10 TABLET, FILM COATED ORAL EVERY 6 HOURS PRN
Qty: 10 TABLET | Refills: 0 | Status: SHIPPED | OUTPATIENT
Start: 2021-02-05 | End: 2021-03-26 | Stop reason: HOSPADM

## 2021-02-05 RX ADMIN — DROPERIDOL 2.5 MG: 2.5 INJECTION, SOLUTION INTRAMUSCULAR; INTRAVENOUS at 09:20

## 2021-02-05 RX ADMIN — SODIUM CHLORIDE 1000 ML: 9 INJECTION, SOLUTION INTRAVENOUS at 07:46

## 2021-02-05 RX ADMIN — ONDANSETRON 4 MG: 2 INJECTION INTRAMUSCULAR; INTRAVENOUS at 08:15

## 2021-02-05 RX ADMIN — KETOROLAC TROMETHAMINE 30 MG: 30 INJECTION, SOLUTION INTRAMUSCULAR at 07:47

## 2021-02-05 RX ADMIN — IOPAMIDOL 100 ML: 612 INJECTION, SOLUTION INTRAVENOUS at 08:23

## 2021-02-05 NOTE — ED PROVIDER NOTES
Subjective   35-year-old female presenting with abdominal pain.  She states that for the last few months she has had vaginal bleeding and abdominal pain.  She claims this all started after having a Pap smear.  The bleeding has gotten heavier over the last several days, yesterday and today the pain has been increasingly severe.  It is a sharp stabbing pain.  There are no specific alleviating or aggravating factors.  She has had some urinary frequency.  She denies any nausea, vomiting, hematuria, fevers, chills.  She does note that she was scheduled for a hysterectomy a week ago but this had to be rescheduled.          Review of Systems   Constitutional: Negative.    HENT: Negative.    Eyes: Negative.    Respiratory: Negative.    Cardiovascular: Negative.    Gastrointestinal: Positive for abdominal pain. Negative for nausea and vomiting.   Genitourinary: Positive for frequency and vaginal bleeding. Negative for flank pain.   Musculoskeletal: Negative.    Skin: Negative.    Neurological: Negative.    Psychiatric/Behavioral: Negative.        Past Medical History:   Diagnosis Date   • Acid reflux    • Allergy    • Anxiety    • Arthritis    • Asthma    • Bladder incontinence    • Constipation    • Cough    • Depression    • Depression    • Difficulty walking    • Kidney stones    • Migraine    • Pain with urination    • Urinary frequency        Allergies   Allergen Reactions   • Levofloxacin Hives   • Peanut (Diagnostic)    • Penicillins    • Adhesive Tape Rash   • Shellfish Allergy Rash       Past Surgical History:   Procedure Laterality Date   • KIDNEY STONE SURGERY  2007   • OOPHORECTOMY Right 2009       Family History   Problem Relation Age of Onset   • Ovarian cancer Mother    • Ovarian cancer Paternal Grandmother    • Stroke Paternal Grandmother    • Diabetes Paternal Grandmother    • Hypertension Paternal Grandmother    • Hyperlipidemia Paternal Grandmother    • Cystic fibrosis Maternal Aunt        Social History      Socioeconomic History   • Marital status:      Spouse name: Not on file   • Number of children: Not on file   • Years of education: Not on file   • Highest education level: Not on file   Tobacco Use   • Smoking status: Current Every Day Smoker     Packs/day: 1.00     Types: Cigarettes   • Smokeless tobacco: Never Used   Substance and Sexual Activity   • Alcohol use: No   • Drug use: No   • Sexual activity: Yes     Partners: Male           Objective   Physical Exam  Constitutional:       General: She is not in acute distress.     Appearance: Normal appearance. She is not ill-appearing, toxic-appearing or diaphoretic.   HENT:      Head: Normocephalic and atraumatic.      Right Ear: External ear normal.      Left Ear: External ear normal.      Nose: Nose normal.      Mouth/Throat:      Mouth: Mucous membranes are moist.      Pharynx: Oropharynx is clear.   Eyes:      Extraocular Movements: Extraocular movements intact.      Conjunctiva/sclera: Conjunctivae normal.      Pupils: Pupils are equal, round, and reactive to light.   Neck:      Musculoskeletal: Normal range of motion.   Cardiovascular:      Rate and Rhythm: Regular rhythm.      Pulses: Normal pulses.      Heart sounds: Normal heart sounds.      Comments: Tachycardic  Pulmonary:      Effort: Pulmonary effort is normal. No respiratory distress.      Breath sounds: Normal breath sounds.   Abdominal:      General: Bowel sounds are normal. There is no distension.      Comments: Diffuse tenderness to minimal palpation   Musculoskeletal: Normal range of motion.         General: No swelling, tenderness or deformity.   Skin:     General: Skin is warm and dry.      Capillary Refill: Capillary refill takes less than 2 seconds.      Findings: No rash.   Neurological:      General: No focal deficit present.      Mental Status: She is alert and oriented to person, place, and time.   Psychiatric:         Behavior: Behavior normal.      Comments: Tearful  "        Procedures           ED Course                                           MDM  Number of Diagnoses or Management Options  Cyst of left ovary:   Left lower quadrant abdominal pain:   Diagnosis management comments: 35-year-old female with abdominal pain and chronic vaginal bleeding.  Well-developed, well-nourished lady in no distress with exam as above.  She is mildly tachycardic and has diffuse abdominal tenderness.  Will obtain labs, urinalysis and CT scan.  Will give IV fluids and symptomatic treatment.  Disposition pending work-up.    DDx: Ovarian cyst, abnormal uterine bleeding, UTI, stone    09:16 EST Blood work is largely unremarkable.  CT scan reveals no acute abnormality.  Patient states the Toradol did not help at all, she is sobbing in pain at this point.  I have ordered her additional medication.  Given the ongoing pain I ordered an ultrasound to look for any pathology in the CT scan may have missed though very unlikely.  Disposition pending.    10:18 EST She feels \"much better\". Ultrasound does reveal a left ovarian cyst, no other acute findings. Will discharge home with outpatient follow up. She is happy with this plan.       Amount and/or Complexity of Data Reviewed  Clinical lab tests: reviewed  Tests in the radiology section of CPT®: reviewed        Final diagnoses:   Left lower quadrant abdominal pain   Cyst of left ovary            Cresencio Novak MD  02/05/21 1018    "

## 2021-02-06 LAB — BACTERIA SPEC AEROBE CULT: ABNORMAL

## 2021-02-08 ENCOUNTER — TELEPHONE (OUTPATIENT)
Dept: OBSTETRICS AND GYNECOLOGY | Facility: CLINIC | Age: 36
End: 2021-02-08

## 2021-02-08 NOTE — TELEPHONE ENCOUNTER
----- Message from Tammi Collado sent at 2/6/2021  1:09 PM EST -----  Regarding: Visit Follow-Up Question  Contact: [phone]  Please tell Dr. Montaño to review my er visit from yesterday Friday, February 5th. I have an ovarian cyst thats 2.5 cmm if not larger, giving me lots of pain. And see what he wants to do and let me know please. Thanks, Tammi Collado.

## 2021-02-09 ENCOUNTER — TELEPHONE (OUTPATIENT)
Dept: OBSTETRICS AND GYNECOLOGY | Facility: CLINIC | Age: 36
End: 2021-02-09

## 2021-02-09 NOTE — TELEPHONE ENCOUNTER
Please let patient know her cyst is benign and simple in size and shape we have started rescheduling surgeries and she should be getting notified soon regarding her date.  Thank you

## 2021-02-09 NOTE — TELEPHONE ENCOUNTER
----- Message from Laila Marin sent at 2/9/2021  3:48 PM EST -----  Patient wants to know if she could get a letter no pushing, pulling,lifting and use of heavy machinery over 5 pounds until her surgery.    elías

## 2021-02-09 NOTE — TELEPHONE ENCOUNTER
----- Message from Laila Marin sent at 2/9/2021 10:36 AM EST -----  Annmarie,  Patient informed of results and she states that she is having a lot of pain. She wanted to know to about having surgery moved up.

## 2021-02-23 ENCOUNTER — TELEPHONE (OUTPATIENT)
Dept: NEUROLOGY | Facility: CLINIC | Age: 36
End: 2021-02-23

## 2021-02-23 NOTE — TELEPHONE ENCOUNTER
Patient came in and updated her ins. She now has [employer] commercial primary ins and [facility] medicaid secondary. She stated that you had got her botox approved but it was through her medicaid. Can you get it approved through her BC[employer]? She stated that she loses her [employer] J the end of march.

## 2021-02-24 NOTE — TELEPHONE ENCOUNTER
HER MEDICAID INSURANCE STATES SHE IS NOT ELIGABLE FOR BOTOX.  I WILL CALL AND DISCUSS THIS WITH PATIENT.

## 2021-03-03 ENCOUNTER — TELEPHONE (OUTPATIENT)
Dept: NEUROLOGY | Facility: CLINIC | Age: 36
End: 2021-03-03

## 2021-03-22 ENCOUNTER — APPOINTMENT (OUTPATIENT)
Dept: PREADMISSION TESTING | Facility: HOSPITAL | Age: 36
End: 2021-03-22

## 2021-03-22 VITALS — WEIGHT: 173.8 LBS | HEIGHT: 65 IN | BODY MASS INDEX: 28.96 KG/M2

## 2021-03-22 DIAGNOSIS — N93.9 ABNORMAL UTERINE BLEEDING (AUB): ICD-10-CM

## 2021-03-22 DIAGNOSIS — G43.831 MENSTRUAL MIGRAINE, WITH INTRACTABLE MIGRAINE, SO STATED, WITH STATUS MIGRAINOSUS: ICD-10-CM

## 2021-03-22 DIAGNOSIS — R10.2 PELVIC PAIN: ICD-10-CM

## 2021-03-22 LAB
ABO GROUP BLD: NORMAL
B-HCG UR QL: NEGATIVE
DEPRECATED RDW RBC AUTO: 41.7 FL (ref 37–54)
ERYTHROCYTE [DISTWIDTH] IN BLOOD BY AUTOMATED COUNT: 12.2 % (ref 12.3–15.4)
HCT VFR BLD AUTO: 43.8 % (ref 34–46.6)
HGB BLD-MCNC: 15.2 G/DL (ref 12–15.9)
MCH RBC QN AUTO: 32 PG (ref 26.6–33)
MCHC RBC AUTO-ENTMCNC: 34.7 G/DL (ref 31.5–35.7)
MCV RBC AUTO: 92.2 FL (ref 79–97)
PLATELET # BLD AUTO: 201 10*3/MM3 (ref 140–450)
PMV BLD AUTO: 9.8 FL (ref 6–12)
RBC # BLD AUTO: 4.75 10*6/MM3 (ref 3.77–5.28)
RH BLD: POSITIVE
WBC # BLD AUTO: 8.73 10*3/MM3 (ref 3.4–10.8)

## 2021-03-22 PROCEDURE — C9803 HOPD COVID-19 SPEC COLLECT: HCPCS

## 2021-03-22 PROCEDURE — 85610 PROTHROMBIN TIME: CPT | Performed by: OBSTETRICS & GYNECOLOGY

## 2021-03-22 PROCEDURE — U0004 COV-19 TEST NON-CDC HGH THRU: HCPCS

## 2021-03-22 PROCEDURE — 86901 BLOOD TYPING SEROLOGIC RH(D): CPT

## 2021-03-22 PROCEDURE — 81025 URINE PREGNANCY TEST: CPT

## 2021-03-22 PROCEDURE — 85027 COMPLETE CBC AUTOMATED: CPT

## 2021-03-22 PROCEDURE — 86900 BLOOD TYPING SEROLOGIC ABO: CPT

## 2021-03-22 PROCEDURE — 80053 COMPREHEN METABOLIC PANEL: CPT | Performed by: OBSTETRICS & GYNECOLOGY

## 2021-03-22 PROCEDURE — 85730 THROMBOPLASTIN TIME PARTIAL: CPT | Performed by: OBSTETRICS & GYNECOLOGY

## 2021-03-22 RX ORDER — ACETAMINOPHEN 500 MG
500 TABLET ORAL EVERY 6 HOURS PRN
COMMUNITY
End: 2021-03-26 | Stop reason: HOSPADM

## 2021-03-23 LAB — SARS-COV-2 RNA NOSE QL NAA+PROBE: NOT DETECTED

## 2021-03-24 PROCEDURE — S0260 H&P FOR SURGERY: HCPCS | Performed by: OBSTETRICS & GYNECOLOGY

## 2021-03-25 ENCOUNTER — ANESTHESIA EVENT (OUTPATIENT)
Dept: PERIOP | Facility: HOSPITAL | Age: 36
End: 2021-03-25

## 2021-03-25 ENCOUNTER — HOSPITAL ENCOUNTER (OUTPATIENT)
Facility: HOSPITAL | Age: 36
Discharge: HOME | End: 2021-03-26
Attending: OBSTETRICS & GYNECOLOGY | Admitting: OBSTETRICS & GYNECOLOGY

## 2021-03-25 ENCOUNTER — ANESTHESIA (OUTPATIENT)
Dept: PERIOP | Facility: HOSPITAL | Age: 36
End: 2021-03-25

## 2021-03-25 DIAGNOSIS — R10.2 PELVIC PAIN: ICD-10-CM

## 2021-03-25 DIAGNOSIS — Z90.710 S/P HYSTERECTOMY: Primary | ICD-10-CM

## 2021-03-25 DIAGNOSIS — N93.9 ABNORMAL UTERINE BLEEDING (AUB): ICD-10-CM

## 2021-03-25 DIAGNOSIS — G43.831 MENSTRUAL MIGRAINE, WITH INTRACTABLE MIGRAINE, SO STATED, WITH STATUS MIGRAINOSUS: ICD-10-CM

## 2021-03-25 LAB
ABO GROUP BLD: NORMAL
BLD GP AB SCN SERPL QL: NEGATIVE
RH BLD: POSITIVE
T&S EXPIRATION DATE: NORMAL

## 2021-03-25 PROCEDURE — 25010000002 HYDROMORPHONE 1 MG/ML SOLUTION: Performed by: OBSTETRICS & GYNECOLOGY

## 2021-03-25 PROCEDURE — 58571 TLH W/T/O 250 G OR LESS: CPT | Performed by: OBSTETRICS & GYNECOLOGY

## 2021-03-25 PROCEDURE — 86900 BLOOD TYPING SEROLOGIC ABO: CPT | Performed by: OBSTETRICS & GYNECOLOGY

## 2021-03-25 PROCEDURE — 25010000002 METOCLOPRAMIDE PER 10 MG: Performed by: NURSE ANESTHETIST, CERTIFIED REGISTERED

## 2021-03-25 PROCEDURE — 25010000002 ONDANSETRON PER 1 MG: Performed by: NURSE ANESTHETIST, CERTIFIED REGISTERED

## 2021-03-25 PROCEDURE — 25010000002 HYDROMORPHONE PER 4 MG: Performed by: NURSE ANESTHETIST, CERTIFIED REGISTERED

## 2021-03-25 PROCEDURE — 25010000002 KETOROLAC TROMETHAMINE PER 15 MG: Performed by: NURSE ANESTHETIST, CERTIFIED REGISTERED

## 2021-03-25 PROCEDURE — G0378 HOSPITAL OBSERVATION PER HR: HCPCS

## 2021-03-25 PROCEDURE — 25010000002 PROPOFOL 200 MG/20ML EMULSION: Performed by: NURSE ANESTHETIST, CERTIFIED REGISTERED

## 2021-03-25 PROCEDURE — 25010000002 MIDAZOLAM PER 1MG: Performed by: NURSE ANESTHETIST, CERTIFIED REGISTERED

## 2021-03-25 PROCEDURE — 25010000002 DEXAMETHASONE PER 1 MG: Performed by: NURSE ANESTHETIST, CERTIFIED REGISTERED

## 2021-03-25 PROCEDURE — 25010000002 HYDROMORPHONE 1 MG/ML SOLUTION: Performed by: NURSE ANESTHETIST, CERTIFIED REGISTERED

## 2021-03-25 PROCEDURE — 25010000003 CEFAZOLIN SODIUM-DEXTROSE 2-3 GM-%(50ML) RECONSTITUTED SOLUTION: Performed by: OBSTETRICS & GYNECOLOGY

## 2021-03-25 PROCEDURE — 94799 UNLISTED PULMONARY SVC/PX: CPT

## 2021-03-25 PROCEDURE — 86901 BLOOD TYPING SEROLOGIC RH(D): CPT | Performed by: OBSTETRICS & GYNECOLOGY

## 2021-03-25 PROCEDURE — 25010000003 MEPERIDINE PER 100 MG: Performed by: NURSE ANESTHETIST, CERTIFIED REGISTERED

## 2021-03-25 PROCEDURE — 86850 RBC ANTIBODY SCREEN: CPT | Performed by: OBSTETRICS & GYNECOLOGY

## 2021-03-25 DEVICE — ABSORBABLE RELOAD
Type: IMPLANTABLE DEVICE | Site: ABDOMEN | Status: FUNCTIONAL
Brand: V-LOC 180

## 2021-03-25 RX ORDER — ONDANSETRON 2 MG/ML
INJECTION INTRAMUSCULAR; INTRAVENOUS AS NEEDED
Status: DISCONTINUED | OUTPATIENT
Start: 2021-03-25 | End: 2021-03-25 | Stop reason: SURG

## 2021-03-25 RX ORDER — LIDOCAINE HYDROCHLORIDE 20 MG/ML
INJECTION, SOLUTION INTRAVENOUS AS NEEDED
Status: DISCONTINUED | OUTPATIENT
Start: 2021-03-25 | End: 2021-03-25 | Stop reason: SURG

## 2021-03-25 RX ORDER — NALOXONE HCL 0.4 MG/ML
0.4 VIAL (ML) INJECTION
Status: DISCONTINUED | OUTPATIENT
Start: 2021-03-25 | End: 2021-03-26 | Stop reason: HOSPADM

## 2021-03-25 RX ORDER — MEPERIDINE HYDROCHLORIDE 25 MG/ML
25 INJECTION INTRAMUSCULAR; INTRAVENOUS; SUBCUTANEOUS ONCE
Status: COMPLETED | OUTPATIENT
Start: 2021-03-25 | End: 2021-03-25

## 2021-03-25 RX ORDER — DEXTROSE AND SODIUM CHLORIDE 5; .45 G/100ML; G/100ML
125 INJECTION, SOLUTION INTRAVENOUS CONTINUOUS
Status: DISCONTINUED | OUTPATIENT
Start: 2021-03-25 | End: 2021-03-26 | Stop reason: HOSPADM

## 2021-03-25 RX ORDER — SODIUM CHLORIDE 0.9 % (FLUSH) 0.9 %
10 SYRINGE (ML) INJECTION AS NEEDED
Status: DISCONTINUED | OUTPATIENT
Start: 2021-03-25 | End: 2021-03-25 | Stop reason: HOSPADM

## 2021-03-25 RX ORDER — DEXAMETHASONE SODIUM PHOSPHATE 4 MG/ML
INJECTION, SOLUTION INTRA-ARTICULAR; INTRALESIONAL; INTRAMUSCULAR; INTRAVENOUS; SOFT TISSUE AS NEEDED
Status: DISCONTINUED | OUTPATIENT
Start: 2021-03-25 | End: 2021-03-25 | Stop reason: SURG

## 2021-03-25 RX ORDER — SODIUM CHLORIDE, SODIUM LACTATE, POTASSIUM CHLORIDE, CALCIUM CHLORIDE 600; 310; 30; 20 MG/100ML; MG/100ML; MG/100ML; MG/100ML
1000 INJECTION, SOLUTION INTRAVENOUS CONTINUOUS
Status: DISCONTINUED | OUTPATIENT
Start: 2021-03-25 | End: 2021-03-25

## 2021-03-25 RX ORDER — ONDANSETRON 2 MG/ML
4 INJECTION INTRAMUSCULAR; INTRAVENOUS ONCE AS NEEDED
Status: DISCONTINUED | OUTPATIENT
Start: 2021-03-25 | End: 2021-03-25 | Stop reason: HOSPADM

## 2021-03-25 RX ORDER — SIMETHICONE 80 MG
80 TABLET,CHEWABLE ORAL 4 TIMES DAILY PRN
Status: DISCONTINUED | OUTPATIENT
Start: 2021-03-25 | End: 2021-03-26 | Stop reason: HOSPADM

## 2021-03-25 RX ORDER — LIDOCAINE HYDROCHLORIDE 10 MG/ML
0.5 INJECTION, SOLUTION INFILTRATION; PERINEURAL ONCE AS NEEDED
Status: DISCONTINUED | OUTPATIENT
Start: 2021-03-25 | End: 2021-03-25 | Stop reason: HOSPADM

## 2021-03-25 RX ORDER — OXYCODONE HYDROCHLORIDE 5 MG/1
10 TABLET ORAL EVERY 4 HOURS PRN
Status: DISCONTINUED | OUTPATIENT
Start: 2021-03-25 | End: 2021-03-26 | Stop reason: HOSPADM

## 2021-03-25 RX ORDER — FAMOTIDINE 10 MG/ML
20 INJECTION, SOLUTION INTRAVENOUS ONCE
Status: COMPLETED | OUTPATIENT
Start: 2021-03-25 | End: 2021-03-25

## 2021-03-25 RX ORDER — SCOLOPAMINE TRANSDERMAL SYSTEM 1 MG/1
1 PATCH, EXTENDED RELEASE TRANSDERMAL
Status: DISCONTINUED | OUTPATIENT
Start: 2021-03-25 | End: 2021-03-25

## 2021-03-25 RX ORDER — DOCUSATE SODIUM 100 MG/1
100 CAPSULE, LIQUID FILLED ORAL 2 TIMES DAILY PRN
Status: DISCONTINUED | OUTPATIENT
Start: 2021-03-25 | End: 2021-03-26 | Stop reason: HOSPADM

## 2021-03-25 RX ORDER — ACETAMINOPHEN 500 MG
1000 TABLET ORAL EVERY 8 HOURS
Status: DISCONTINUED | OUTPATIENT
Start: 2021-03-25 | End: 2021-03-26 | Stop reason: HOSPADM

## 2021-03-25 RX ORDER — OXYCODONE HYDROCHLORIDE 5 MG/1
5 TABLET ORAL EVERY 4 HOURS PRN
Status: DISCONTINUED | OUTPATIENT
Start: 2021-03-25 | End: 2021-03-26 | Stop reason: HOSPADM

## 2021-03-25 RX ORDER — HYDROXYZINE PAMOATE 25 MG/1
50 CAPSULE ORAL ONCE
Status: COMPLETED | OUTPATIENT
Start: 2021-03-25 | End: 2021-03-25

## 2021-03-25 RX ORDER — NEOSTIGMINE METHYLSULFATE 5 MG/5 ML
SYRINGE (ML) INTRAVENOUS AS NEEDED
Status: DISCONTINUED | OUTPATIENT
Start: 2021-03-25 | End: 2021-03-25 | Stop reason: SURG

## 2021-03-25 RX ORDER — SODIUM CHLORIDE 0.9 % (FLUSH) 0.9 %
3 SYRINGE (ML) INJECTION EVERY 12 HOURS SCHEDULED
Status: DISCONTINUED | OUTPATIENT
Start: 2021-03-25 | End: 2021-03-25 | Stop reason: HOSPADM

## 2021-03-25 RX ORDER — LORAZEPAM 2 MG/ML
1 INJECTION INTRAMUSCULAR EVERY 4 HOURS PRN
Status: DISCONTINUED | OUTPATIENT
Start: 2021-03-25 | End: 2021-03-25 | Stop reason: HOSPADM

## 2021-03-25 RX ORDER — BISACODYL 10 MG
10 SUPPOSITORY, RECTAL RECTAL DAILY PRN
Status: DISCONTINUED | OUTPATIENT
Start: 2021-03-25 | End: 2021-03-26 | Stop reason: HOSPADM

## 2021-03-25 RX ORDER — NALOXONE HCL 0.4 MG/ML
0.1 VIAL (ML) INJECTION
Status: DISCONTINUED | OUTPATIENT
Start: 2021-03-25 | End: 2021-03-26 | Stop reason: HOSPADM

## 2021-03-25 RX ORDER — VENLAFAXINE HYDROCHLORIDE 150 MG/1
150 CAPSULE, EXTENDED RELEASE ORAL DAILY
Status: DISCONTINUED | OUTPATIENT
Start: 2021-03-25 | End: 2021-03-26 | Stop reason: HOSPADM

## 2021-03-25 RX ORDER — METOCLOPRAMIDE HYDROCHLORIDE 5 MG/ML
INJECTION INTRAMUSCULAR; INTRAVENOUS AS NEEDED
Status: DISCONTINUED | OUTPATIENT
Start: 2021-03-25 | End: 2021-03-25 | Stop reason: SURG

## 2021-03-25 RX ORDER — HYDROMORPHONE HCL 110MG/55ML
PATIENT CONTROLLED ANALGESIA SYRINGE INTRAVENOUS AS NEEDED
Status: DISCONTINUED | OUTPATIENT
Start: 2021-03-25 | End: 2021-03-25 | Stop reason: SURG

## 2021-03-25 RX ORDER — ONDANSETRON 4 MG/1
4 TABLET, FILM COATED ORAL EVERY 6 HOURS PRN
Status: DISCONTINUED | OUTPATIENT
Start: 2021-03-25 | End: 2021-03-26 | Stop reason: HOSPADM

## 2021-03-25 RX ORDER — PROPOFOL 10 MG/ML
INJECTION, EMULSION INTRAVENOUS AS NEEDED
Status: DISCONTINUED | OUTPATIENT
Start: 2021-03-25 | End: 2021-03-25 | Stop reason: SURG

## 2021-03-25 RX ORDER — MAGNESIUM HYDROXIDE 1200 MG/15ML
LIQUID ORAL AS NEEDED
Status: DISCONTINUED | OUTPATIENT
Start: 2021-03-25 | End: 2021-03-25 | Stop reason: HOSPADM

## 2021-03-25 RX ORDER — CEFAZOLIN SODIUM 2 G/50ML
2 SOLUTION INTRAVENOUS ONCE
Status: COMPLETED | OUTPATIENT
Start: 2021-03-25 | End: 2021-03-25

## 2021-03-25 RX ORDER — IBUPROFEN 800 MG/1
800 TABLET ORAL EVERY 8 HOURS
Status: DISCONTINUED | OUTPATIENT
Start: 2021-03-25 | End: 2021-03-26 | Stop reason: HOSPADM

## 2021-03-25 RX ORDER — MIDAZOLAM HYDROCHLORIDE 2 MG/2ML
2 INJECTION, SOLUTION INTRAMUSCULAR; INTRAVENOUS ONCE
Status: COMPLETED | OUTPATIENT
Start: 2021-03-25 | End: 2021-03-25

## 2021-03-25 RX ORDER — ONDANSETRON 2 MG/ML
4 INJECTION INTRAMUSCULAR; INTRAVENOUS EVERY 6 HOURS PRN
Status: DISCONTINUED | OUTPATIENT
Start: 2021-03-25 | End: 2021-03-26 | Stop reason: HOSPADM

## 2021-03-25 RX ORDER — PHENAZOPYRIDINE HYDROCHLORIDE 100 MG/1
200 TABLET, FILM COATED ORAL ONCE
Status: COMPLETED | OUTPATIENT
Start: 2021-03-25 | End: 2021-03-25

## 2021-03-25 RX ORDER — ROCURONIUM BROMIDE 10 MG/ML
INJECTION, SOLUTION INTRAVENOUS AS NEEDED
Status: DISCONTINUED | OUTPATIENT
Start: 2021-03-25 | End: 2021-03-25 | Stop reason: SURG

## 2021-03-25 RX ORDER — KETOROLAC TROMETHAMINE 30 MG/ML
INJECTION, SOLUTION INTRAMUSCULAR; INTRAVENOUS AS NEEDED
Status: DISCONTINUED | OUTPATIENT
Start: 2021-03-25 | End: 2021-03-25 | Stop reason: SURG

## 2021-03-25 RX ORDER — MEPERIDINE HYDROCHLORIDE 25 MG/ML
50 INJECTION INTRAMUSCULAR; INTRAVENOUS; SUBCUTANEOUS
Status: COMPLETED | OUTPATIENT
Start: 2021-03-25 | End: 2021-03-25

## 2021-03-25 RX ADMIN — HYDROMORPHONE HYDROCHLORIDE 0.5 MG: 1 INJECTION, SOLUTION INTRAMUSCULAR; INTRAVENOUS; SUBCUTANEOUS at 12:12

## 2021-03-25 RX ADMIN — GLYCOPYRROLATE 0.8 MG: 0.2 INJECTION, SOLUTION INTRAMUSCULAR; INTRAVENOUS at 08:55

## 2021-03-25 RX ADMIN — MEPERIDINE HYDROCHLORIDE 25 MG: 25 INJECTION, SOLUTION INTRAMUSCULAR; INTRAVENOUS; SUBCUTANEOUS at 09:45

## 2021-03-25 RX ADMIN — SCOPALAMINE 1 PATCH: 1 PATCH, EXTENDED RELEASE TRANSDERMAL at 07:26

## 2021-03-25 RX ADMIN — HYDROMORPHONE HYDROCHLORIDE 0.5 MG: 1 INJECTION, SOLUTION INTRAMUSCULAR; INTRAVENOUS; SUBCUTANEOUS at 16:36

## 2021-03-25 RX ADMIN — METOCLOPRAMIDE 10 MG: 5 INJECTION, SOLUTION INTRAMUSCULAR; INTRAVENOUS at 07:37

## 2021-03-25 RX ADMIN — PHENAZOPYRIDINE HYDROCHLORIDE 200 MG: 100 TABLET ORAL at 06:30

## 2021-03-25 RX ADMIN — PROPOFOL 200 MG: 10 INJECTION, EMULSION INTRAVENOUS at 07:37

## 2021-03-25 RX ADMIN — DEXAMETHASONE SODIUM PHOSPHATE 10 MG: 4 INJECTION, SOLUTION INTRAMUSCULAR; INTRAVENOUS at 07:37

## 2021-03-25 RX ADMIN — ONDANSETRON 4 MG: 2 INJECTION INTRAMUSCULAR; INTRAVENOUS at 07:37

## 2021-03-25 RX ADMIN — FAMOTIDINE 20 MG: 10 INJECTION INTRAVENOUS at 07:26

## 2021-03-25 RX ADMIN — SIMETHICONE 80 MG: 80 TABLET, CHEWABLE ORAL at 15:51

## 2021-03-25 RX ADMIN — OXYCODONE 10 MG: 5 TABLET ORAL at 22:46

## 2021-03-25 RX ADMIN — ACETAMINOPHEN 1000 MG: 500 TABLET ORAL at 15:51

## 2021-03-25 RX ADMIN — HYDROMORPHONE HYDROCHLORIDE 0.5 MG: 1 INJECTION, SOLUTION INTRAMUSCULAR; INTRAVENOUS; SUBCUTANEOUS at 09:33

## 2021-03-25 RX ADMIN — MIDAZOLAM HYDROCHLORIDE 2 MG: 1 INJECTION, SOLUTION INTRAMUSCULAR; INTRAVENOUS at 07:26

## 2021-03-25 RX ADMIN — Medication 5 MG: at 08:55

## 2021-03-25 RX ADMIN — OXYCODONE 10 MG: 5 TABLET ORAL at 18:42

## 2021-03-25 RX ADMIN — HYDROMORPHONE HYDROCHLORIDE 2 MG: 2 INJECTION, SOLUTION INTRAMUSCULAR; INTRAVENOUS; SUBCUTANEOUS at 07:37

## 2021-03-25 RX ADMIN — SODIUM CHLORIDE, POTASSIUM CHLORIDE, SODIUM LACTATE AND CALCIUM CHLORIDE 1000 ML: 600; 310; 30; 20 INJECTION, SOLUTION INTRAVENOUS at 07:26

## 2021-03-25 RX ADMIN — VENLAFAXINE HYDROCHLORIDE 150 MG: 150 CAPSULE, EXTENDED RELEASE ORAL at 16:37

## 2021-03-25 RX ADMIN — KETOROLAC TROMETHAMINE 30 MG: 30 INJECTION, SOLUTION INTRAMUSCULAR at 08:55

## 2021-03-25 RX ADMIN — HYDROXYZINE PAMOATE 50 MG: 25 CAPSULE ORAL at 21:44

## 2021-03-25 RX ADMIN — DEXTROSE AND SODIUM CHLORIDE 125 ML/HR: 5; 450 INJECTION, SOLUTION INTRAVENOUS at 18:44

## 2021-03-25 RX ADMIN — SODIUM CHLORIDE, POTASSIUM CHLORIDE, SODIUM LACTATE AND CALCIUM CHLORIDE: 600; 310; 30; 20 INJECTION, SOLUTION INTRAVENOUS at 08:00

## 2021-03-25 RX ADMIN — ROCURONIUM BROMIDE 40 MG: 10 INJECTION INTRAVENOUS at 07:37

## 2021-03-25 RX ADMIN — LIDOCAINE HYDROCHLORIDE 100 MG: 20 INJECTION, SOLUTION INTRAVENOUS at 07:37

## 2021-03-25 RX ADMIN — MEPERIDINE HYDROCHLORIDE 25 MG: 25 INJECTION, SOLUTION INTRAMUSCULAR; INTRAVENOUS; SUBCUTANEOUS at 09:14

## 2021-03-25 RX ADMIN — HYDROMORPHONE HYDROCHLORIDE 0.5 MG: 1 INJECTION, SOLUTION INTRAMUSCULAR; INTRAVENOUS; SUBCUTANEOUS at 09:56

## 2021-03-25 RX ADMIN — IBUPROFEN 800 MG: 800 TABLET, FILM COATED ORAL at 21:24

## 2021-03-25 RX ADMIN — CEFAZOLIN SODIUM 2 G: 2 SOLUTION INTRAVENOUS at 07:32

## 2021-03-25 NOTE — ANESTHESIA PREPROCEDURE EVALUATION
Anesthesia Evaluation     Patient summary reviewed and Nursing notes reviewed   no history of anesthetic complications:  NPO Solid Status: > 8 hours  NPO Liquid Status: > 8 hours           Airway   Mallampati: II  TM distance: >3 FB  Neck ROM: full  no difficulty expected  Dental - normal exam     Pulmonary - normal exam   (+) a smoker Current Abstained day of surgery, asthma,  Cardiovascular - negative cardio ROS and normal exam    Rhythm: regular  Rate: normal        Neuro/Psych  (+) headaches, psychiatric history Anxiety and Depression,     GI/Hepatic/Renal/Endo    (+)  GERD,  renal disease stones,     Musculoskeletal     Abdominal    Substance History - negative use     OB/GYN negative ob/gyn ROS         Other   arthritis,                      Anesthesia Plan    ASA 2     general   (Risks and benefits discussed including risk of aspiration, recall and dental damage. All patient questions answered.    Patient told that a breathing tube will be used to manage the airway.    Will continue with plan of care.)  intravenous induction     Anesthetic plan, all risks, benefits, and alternatives have been provided, discussed and informed consent has been obtained with: patient.

## 2021-03-25 NOTE — ANESTHESIA PROCEDURE NOTES
Airway  Urgency: elective    Date/Time: 3/25/2021 7:38 AM  Airway not difficult    General Information and Staff    Patient location during procedure: OR  CRNA: Bishop Fernando CRNA    Indications and Patient Condition  Indications for airway management: airway protection    Preoxygenated: yes  Mask difficulty assessment: 1 - vent by mask    Final Airway Details  Final airway type: endotracheal airway      Successful airway: ETT  Cuffed: yes   Successful intubation technique: direct laryngoscopy  Facilitating devices/methods: intubating stylet  Endotracheal tube insertion site: oral  Blade: Savana  Blade size: 4  ETT size (mm): 7.5  Cormack-Lehane Classification: grade I - full view of glottis  Placement verified by: chest auscultation and capnometry   Measured from: lips  ETT/EBT  to lips (cm): 21  Number of attempts at approach: 1  Assessment: lips, teeth, and gum same as pre-op and atraumatic intubation    Additional Comments  Dentition and Lips as preoperative assessment. Airway placed without complication. ETT cuff inflated to minimal occlusive pressure.

## 2021-03-25 NOTE — ANESTHESIA POSTPROCEDURE EVALUATION
Patient: Tammi Collado    Procedure Summary     Date: 03/25/21 Room / Location: [facility] OR 2 /  [facility] OR    Anesthesia Start: 0732 Anesthesia Stop: 0901    Procedure: TOTAL LAPAROSCOPIC HYSTERECTOMY/LSO/Cystoscopy (Left Abdomen) Diagnosis:       Menstrual migraine, with intractable migraine, so stated, with status migrainosus      Pelvic pain      Abnormal uterine bleeding (AUB)      (Menstrual migraine, with intractable migraine, so stated, with status migrainosus [G43.831])      (Pelvic pain [R10.2])      (Abnormal uterine bleeding (AUB) [N93.9])    Surgeons: Chip Montaño MD Provider: Bishop Fernando CRNA    Anesthesia Type: general ASA Status: 2          Anesthesia Type: general    Vitals  Vitals Value Taken Time   /62 03/25/21 1012   Temp 97.4 °F (36.3 °C) 03/25/21 0907   Pulse 65 03/25/21 1017   Resp 16 03/25/21 1010   SpO2 99 % 03/25/21 1017   Vitals shown include unvalidated device data.          Post Anesthesia Care and Evaluation    Patient location during evaluation: PACU  Patient participation: complete - patient participated  Level of consciousness: awake  Pain score: 3  Pain management: adequate  Airway patency: patent  Anesthetic complications: No anesthetic complications  PONV Status: controlled  Cardiovascular status: acceptable and stable  Respiratory status: acceptable and face mask  Hydration status: acceptable

## 2021-03-26 VITALS
SYSTOLIC BLOOD PRESSURE: 93 MMHG | TEMPERATURE: 98.3 F | DIASTOLIC BLOOD PRESSURE: 54 MMHG | OXYGEN SATURATION: 95 % | HEART RATE: 67 BPM | RESPIRATION RATE: 16 BRPM

## 2021-03-26 LAB
DEPRECATED RDW RBC AUTO: 41.5 FL (ref 37–54)
ERYTHROCYTE [DISTWIDTH] IN BLOOD BY AUTOMATED COUNT: 12.1 % (ref 12.3–15.4)
HCT VFR BLD AUTO: 38.2 % (ref 34–46.6)
HGB BLD-MCNC: 13 G/DL (ref 12–15.9)
MCH RBC QN AUTO: 31.6 PG (ref 26.6–33)
MCHC RBC AUTO-ENTMCNC: 34 G/DL (ref 31.5–35.7)
MCV RBC AUTO: 92.9 FL (ref 79–97)
PLATELET # BLD AUTO: 165 10*3/MM3 (ref 140–450)
PMV BLD AUTO: 10.4 FL (ref 6–12)
RBC # BLD AUTO: 4.11 10*6/MM3 (ref 3.77–5.28)
WBC # BLD AUTO: 14.65 10*3/MM3 (ref 3.4–10.8)

## 2021-03-26 PROCEDURE — 85027 COMPLETE CBC AUTOMATED: CPT | Performed by: OBSTETRICS & GYNECOLOGY

## 2021-03-26 PROCEDURE — G0378 HOSPITAL OBSERVATION PER HR: HCPCS

## 2021-03-26 PROCEDURE — 99024 POSTOP FOLLOW-UP VISIT: CPT | Performed by: NURSE PRACTITIONER

## 2021-03-26 RX ORDER — SCOLOPAMINE TRANSDERMAL SYSTEM 1 MG/1
1 PATCH, EXTENDED RELEASE TRANSDERMAL
Qty: 4 PATCH | Refills: 0 | Status: SHIPPED | OUTPATIENT
Start: 2021-03-26 | End: 2021-04-02

## 2021-03-26 RX ORDER — ACETAMINOPHEN 500 MG
1000 TABLET ORAL EVERY 8 HOURS
Qty: 60 TABLET | Refills: 0 | Status: SHIPPED | OUTPATIENT
Start: 2021-03-26

## 2021-03-26 RX ORDER — DOCUSATE SODIUM 100 MG/1
100 CAPSULE, LIQUID FILLED ORAL 2 TIMES DAILY
Qty: 60 CAPSULE | Refills: 0 | Status: SHIPPED | OUTPATIENT
Start: 2021-03-26

## 2021-03-26 RX ORDER — IBUPROFEN 800 MG/1
800 TABLET ORAL EVERY 8 HOURS PRN
Qty: 40 TABLET | Refills: 0 | Status: SHIPPED | OUTPATIENT
Start: 2021-03-26

## 2021-03-26 RX ORDER — OXYCODONE HYDROCHLORIDE 5 MG/1
5 TABLET ORAL EVERY 4 HOURS PRN
Qty: 18 TABLET | Refills: 0 | Status: SHIPPED | OUTPATIENT
Start: 2021-03-26 | End: 2021-04-02

## 2021-03-26 RX ADMIN — ACETAMINOPHEN 1000 MG: 500 TABLET ORAL at 00:34

## 2021-03-26 RX ADMIN — SIMETHICONE 80 MG: 80 TABLET, CHEWABLE ORAL at 07:43

## 2021-03-26 RX ADMIN — DEXTROSE AND SODIUM CHLORIDE 125 ML/HR: 5; 450 INJECTION, SOLUTION INTRAVENOUS at 01:37

## 2021-03-26 RX ADMIN — ACETAMINOPHEN 1000 MG: 500 TABLET ORAL at 07:43

## 2021-03-26 RX ADMIN — IBUPROFEN 800 MG: 800 TABLET, FILM COATED ORAL at 03:56

## 2021-03-26 RX ADMIN — OXYCODONE 10 MG: 5 TABLET ORAL at 02:35

## 2021-03-26 RX ADMIN — OXYCODONE 10 MG: 5 TABLET ORAL at 07:44

## 2021-03-26 RX ADMIN — IBUPROFEN 800 MG: 800 TABLET, FILM COATED ORAL at 11:56

## 2021-03-26 RX ADMIN — DOCUSATE SODIUM 100 MG: 100 CAPSULE, LIQUID FILLED ORAL at 07:43

## 2021-03-26 RX ADMIN — OXYCODONE 10 MG: 5 TABLET ORAL at 11:56

## 2021-03-31 LAB
LAB AP CASE REPORT: NORMAL
PATH REPORT.FINAL DX SPEC: NORMAL

## 2021-04-02 ENCOUNTER — OFFICE VISIT (OUTPATIENT)
Dept: OBSTETRICS AND GYNECOLOGY | Facility: CLINIC | Age: 36
End: 2021-04-02

## 2021-04-02 VITALS
HEIGHT: 65 IN | SYSTOLIC BLOOD PRESSURE: 110 MMHG | BODY MASS INDEX: 29.49 KG/M2 | WEIGHT: 177 LBS | DIASTOLIC BLOOD PRESSURE: 64 MMHG

## 2021-04-02 DIAGNOSIS — Z09 POSTOP CHECK: Primary | ICD-10-CM

## 2021-04-02 PROCEDURE — 99024 POSTOP FOLLOW-UP VISIT: CPT | Performed by: MIDWIFE

## 2021-04-02 RX ORDER — LANSOPRAZOLE 30 MG/1
CAPSULE, DELAYED RELEASE ORAL
COMMUNITY
Start: 2021-03-16

## 2021-04-02 RX ORDER — IMIPRAMINE HYDROCHLORIDE 25 MG/1
TABLET ORAL
COMMUNITY
Start: 2021-04-01

## 2021-04-02 NOTE — PATIENT INSTRUCTIONS
Menopause and Hormone Replacement Therapy  Menopause is a normal time of life when menstrual periods stop completely and the ovaries stop producing the female hormones estrogen and progesterone. This lack of hormones can affect your health and cause undesirable symptoms. Hormone replacement therapy (HRT) can relieve some of those symptoms.  What is hormone replacement therapy?  HRT is the use of artificial (synthetic) hormones to replace hormones that your body has stopped producing because you have reached menopause.  What are my options for HRT?    HRT may consist of the synthetic hormones estrogen and progestin, or it may consist of only estrogen (estrogen-only therapy). You and your health care provider will decide which form of HRT is best for you.  If you choose to be on HRT and you have a uterus, estrogen and progestin are usually prescribed. Estrogen-only therapy is used for women who do not have a uterus.  Possible options for taking HRT include:  · Pills.  · Patches.  · Gels.  · Sprays.  · Vaginal cream.  · Vaginal rings.  · Vaginal inserts.  The amount of hormone(s) that you take and how long you take the hormone(s) varies according to your health. It is important to:  · Begin HRT with the lowest possible dosage.  · Stop HRT as soon as your health care provider tells you to stop.  · Work with your health care provider so that you feel informed and comfortable with your decisions.  What are the benefits of HRT?  HRT can reduce the frequency and severity of menopausal symptoms. Benefits of HRT vary according to the kind of symptoms that you have, how severe they are, and your overall health. HRT may help to improve the following symptoms of menopause:  · Hot flashes and night sweats. These are sudden feelings of heat that spread over the face and body. The skin may turn red, like a blush. Night sweats are hot flashes that happen while you are sleeping or trying to sleep.  · Bone loss (osteoporosis). The  body loses calcium more quickly after menopause, causing the bones to become weaker. This can increase the risk for bone breaks (fractures).  · Vaginal dryness. The lining of the vagina can become thin and dry, which can cause pain during sex or cause infection, burning, or itching.  · Urinary tract infections.  · Urinary incontinence. This is the inability to control when you pass urine.  · Irritability.  · Short-term memory problems.  What are the risks of HRT?  Risks of HRT vary depending on your individual health and medical history. Risks of HRT also depend on whether you receive both estrogen and progestin or you receive estrogen only. HRT may increase the risk of:  · Spotting. This is when a small amount of blood leaks from the vagina unexpectedly.  · Endometrial cancer. This cancer is in the lining of the uterus (endometrium).  · Breast cancer.  · Increased density of breast tissue. This can make it harder to find breast cancer on a breast X-ray (mammogram).  · Stroke.  · Heart disease.  · Blood clots.  · Gallbladder disease.  · Liver disease.  Risks of HRT can increase if you have any of the following conditions:  · Endometrial cancer.  · Liver disease.  · Heart disease.  · Breast cancer.  · History of blood clots.  · History of stroke.  Follow these instructions at home:  · Take over-the-counter and prescription medicines only as told by your health care provider.  · Get mammograms, pelvic exams, and medical checkups as often as told by your health care provider.  · Have Pap tests done as often as told by your health care provider. A Pap test is sometimes called a Pap smear. It is a screening test that is used to check for signs of cancer of the cervix and vagina. A Pap test can also identify the presence of infection or precancerous changes. Pap tests may be done:  ? Every 3 years, starting at age 21.  ? Every 5 years, starting after age 30, in combination with testing for human papillomavirus  (HPV).  ? More often or less often depending on other medical conditions you have, your age, and other risk factors.  · It is up to you to get the results of your Pap test. Ask your health care provider, or the department that is doing the test, when your results will be ready.  · Keep all follow-up visits as told by your health care provider. This is important.  Contact a health care provider if you have:  · Pain or swelling in your legs.  · Shortness of breath.  · Chest pain.  · Lumps or changes in your breasts or armpits.  · Slurred speech.  · Pain, burning, or bleeding when you urinate.  · Unusual vaginal bleeding.  · Dizziness or headaches.  · Weakness or numbness in any part of your arms or legs.  · Pain in your abdomen.  Summary  · Menopause is a normal time of life when menstrual periods stop completely and the ovaries stop producing the female hormones estrogen and progesterone.  · Hormone replacement therapy (HRT) can relieve some of the symptoms of menopause.  · HRT can reduce the frequency and severity of menopausal symptoms.  · Risks of HRT vary depending on your individual health and medical history.  This information is not intended to replace advice given to you by your health care provider. Make sure you discuss any questions you have with your health care provider.  Document Revised: 08/20/2019 Document Reviewed: 08/20/2019  [employer] Patient Education © 2021 [employer]

## 2021-04-02 NOTE — PROGRESS NOTES
"Subjective   Chief Complaint   Patient presents with   • Post-op     8 days post op TLH, wants to discuss starting a hormone patch.     Tammi Collado is a 35 y.o. year old  presenting to be seen for her post-operative visit.  Currently she reports no problems with eating, bowel movements, voiding, or wound drainage and pain is well controlled. She denies hot flashes or night sweats.    The pathology results from her procedure are in Tammi's record and are benign.      OTHER COMPLAINTS:  Nothing else       Objective   /64   Ht 165.1 cm (65\")   Wt 80.3 kg (177 lb)   LMP 2021   BMI 29.45 kg/m²     General:  well developed; well nourished  no acute distress   Abdomen: soft, non-tender; no masses  no umbilical or inguinal hernias are present  incision is clean, dry, intact and without drainage   Pelvis: Not performed.          Assessment   1. S/P TLH with left salpingo-oophorectomy  2. Previous RSO     Plan   1. Nothing per vagina still until 6 weeks after her procedure  2. Follow up 5 weeks    New Medications Ordered This Visit   Medications   • estradiol (Vivelle-Dot) 0.1 MG/24HR patch     Sig: Place 1 patch on the skin as directed by provider 2 (Two) Times a Week.     Dispense:  8 patch     Refill:  12          This note was electronically signed.  SLADE Kumar  2021  "

## 2021-04-05 RX ORDER — ESTRADIOL 0.1 MG/D
1 FILM, EXTENDED RELEASE TRANSDERMAL 2 TIMES WEEKLY
Qty: 8 PATCH | Refills: 12 | Status: SHIPPED | OUTPATIENT
Start: 2021-04-05 | End: 2021-04-09 | Stop reason: SDUPTHER

## 2021-04-09 ENCOUNTER — TELEPHONE (OUTPATIENT)
Dept: OBSTETRICS AND GYNECOLOGY | Facility: CLINIC | Age: 36
End: 2021-04-09

## 2021-04-09 NOTE — TELEPHONE ENCOUNTER
----- Message from Dionna Paredes sent at 4/9/2021  2:06 PM EDT -----  Pt said her insurance will not cover the patches. She said she is also concerned about the patches because she is a smoker & other risks.  (Dr Montaño's pt)    She would like a callback concerning medication.

## 2021-04-09 NOTE — TELEPHONE ENCOUNTER
Patient is concerned about using the Patch since she is a smoker and has a family history of breast cancer.  She is wondering if there is anything over the counter or natural that you recommend instead of the patch that would be safe.

## 2021-04-12 RX ORDER — ESTRADIOL 0.1 MG/D
1 FILM, EXTENDED RELEASE TRANSDERMAL 2 TIMES WEEKLY
Qty: 8 PATCH | Refills: 12 | Status: SHIPPED | OUTPATIENT
Start: 2021-04-12 | End: 2021-10-13 | Stop reason: SDUPTHER

## 2021-05-05 ENCOUNTER — OFFICE VISIT (OUTPATIENT)
Dept: OBSTETRICS AND GYNECOLOGY | Facility: CLINIC | Age: 36
End: 2021-05-05

## 2021-05-05 VITALS
BODY MASS INDEX: 28.49 KG/M2 | DIASTOLIC BLOOD PRESSURE: 78 MMHG | WEIGHT: 171 LBS | SYSTOLIC BLOOD PRESSURE: 98 MMHG | HEIGHT: 65 IN

## 2021-05-05 DIAGNOSIS — Z09 POSTOPERATIVE FOLLOW-UP: Primary | ICD-10-CM

## 2021-05-05 PROCEDURE — 99024 POSTOP FOLLOW-UP VISIT: CPT | Performed by: OBSTETRICS & GYNECOLOGY

## 2021-05-07 RX ORDER — CYPROHEPTADINE HYDROCHLORIDE 4 MG/1
4 TABLET ORAL DAILY
Qty: 30 TABLET | Refills: 5 | Status: SHIPPED | OUTPATIENT
Start: 2021-05-07

## 2021-06-28 ENCOUNTER — TELEPHONE (OUTPATIENT)
Dept: OBSTETRICS AND GYNECOLOGY | Facility: CLINIC | Age: 36
End: 2021-06-28

## 2021-06-28 NOTE — TELEPHONE ENCOUNTER
Laila  Pt called needing note off work for Sat and Sunday.  She is to schedule a f/u -   PLease provide note and make f/u appt -  Thanks

## 2021-06-28 NOTE — TELEPHONE ENCOUNTER
----- Message from Tammi Collado sent at 6/28/2021  7:39 AM EDT -----  Regarding: Visit Follow-Up Question  Contact: [phone]  I talked with Christiano on Saturday. I need a doctor's note for Saturday and Sunday. While having sex I started bleeding pretty bad, I finally stopped spotting last night but wanted u to check everything out to make sure everything is intact and if I should be having sex or not for a while. Thanks

## 2021-06-30 ENCOUNTER — OFFICE VISIT (OUTPATIENT)
Dept: OBSTETRICS AND GYNECOLOGY | Facility: CLINIC | Age: 36
End: 2021-06-30

## 2021-06-30 VITALS
SYSTOLIC BLOOD PRESSURE: 108 MMHG | HEIGHT: 65 IN | BODY MASS INDEX: 27.49 KG/M2 | DIASTOLIC BLOOD PRESSURE: 70 MMHG | WEIGHT: 165 LBS

## 2021-06-30 DIAGNOSIS — N93.0 PCB (POST COITAL BLEEDING): Primary | ICD-10-CM

## 2021-06-30 PROCEDURE — 99213 OFFICE O/P EST LOW 20 MIN: CPT | Performed by: OBSTETRICS & GYNECOLOGY

## 2021-06-30 RX ORDER — METRONIDAZOLE 500 MG/1
500 TABLET ORAL 2 TIMES DAILY
Qty: 14 TABLET | Refills: 0 | Status: SHIPPED | OUTPATIENT
Start: 2021-06-30 | End: 2021-07-07

## 2021-06-30 NOTE — PROGRESS NOTES
Subjective   Chief Complaint   Patient presents with   • post coital bleeding     Tammi Collado is a 35 y.o. year old .  Patient's last menstrual period was 2021.  She presents to be seen because of bleeding after intercourse.  TLH/LSO 3/21/21  PAtient states she had heavy bleeding with intercourse- last Friday. Since then has been having lower abd pains. Spotting persisted for 2 days after. No HRT, but taking Estroven, D3, and women's multivitamin.   Normal urination  Some constipation ( long history- has used MAg citrate in the past, states Miralax never helped)  .     OTHER COMPLAINTS:  Nothing else    The following portions of the patient's history were reviewed and updated as appropriate:  She  has a past medical history of Acid reflux, Allergy, Anxiety, Arthritis, Asthma, Bladder incontinence, Constipation, Cough, Depression, Depression, Difficulty walking, Kidney stones, Migraine, Pain with urination, PONV (postoperative nausea and vomiting), and Urinary frequency.  She does not have any pertinent problems on file.  She  has a past surgical history that includes Kidney stone surgery (); Oophorectomy (Right, ); Esophagogastroduodenoscopy; and total laparoscopic hysterectomy (Left, 3/25/2021).  Her family history includes Cystic fibrosis in her maternal aunt; Diabetes in her paternal grandmother; Hyperlipidemia in her paternal grandmother; Hypertension in her paternal grandmother; Ovarian cancer in her mother and paternal grandmother; Stroke in her paternal grandmother.  She  reports that she has been smoking cigarettes. She has been smoking about 1.00 pack per day. She has never used smokeless tobacco. She reports that she does not drink alcohol and does not use drugs.  Current Outpatient Medications   Medication Sig Dispense Refill   • acetaminophen (TYLENOL) 500 MG tablet Take 2 tablets by mouth Every 8 (Eight) Hours. Alternate with ibuprofen 60 tablet 0   • amitriptyline (ELAVIL) 25 MG  tablet Take 25 mg by mouth Every Night.     • cyproheptadine (PERIACTIN) 4 MG tablet Take 1 tablet by mouth Daily. 30 tablet 5   • docusate sodium (Colace) 100 MG capsule Take 1 capsule by mouth 2 (Two) Times a Day. 60 capsule 0   • EPINEPHrine (EPIPEN) 0.3 MG/0.3ML solution auto-injector injection epinephrine 0.3 mg/0.3 mL injection, auto-injector     • estradiol (Vivelle-Dot) 0.1 MG/24HR patch Place 1 patch on the skin as directed by provider 2 (Two) Times a Week. 8 patch 12   • hydrOXYzine pamoate (VISTARIL) 25 MG capsule Take 25 mg by mouth 3 (Three) Times a Day As Needed.     • ibuprofen (ADVIL,MOTRIN) 800 MG tablet Take 1 tablet by mouth Every 8 (Eight) Hours As Needed for Mild Pain . 40 tablet 0   • lansoprazole (PREVACID) 30 MG capsule      • montelukast (SINGULAIR) 10 MG tablet Take 10 mg by mouth Every Night.  0   • ondansetron ODT (ZOFRAN-ODT) 4 MG disintegrating tablet Place 1 tablet under the tongue Every 6 (Six) Hours As Needed for Nausea or Vomiting. 10 tablet 0   • PROAIR  (90 Base) MCG/ACT inhaler Inhale 2 puffs Every 4 (Four) Hours As Needed.  0   • Spacer/Aero-Holding Chambers (AeroChamber Plus Ben-Vu) misc      • SUMAtriptan (IMITREX) 100 MG tablet   0   • venlafaxine XR (EFFEXOR-XR) 150 MG 24 hr capsule Take 150 mg by mouth Daily.  0     No current facility-administered medications for this visit.     Current Outpatient Medications on File Prior to Visit   Medication Sig   • acetaminophen (TYLENOL) 500 MG tablet Take 2 tablets by mouth Every 8 (Eight) Hours. Alternate with ibuprofen   • amitriptyline (ELAVIL) 25 MG tablet Take 25 mg by mouth Every Night.   • cyproheptadine (PERIACTIN) 4 MG tablet Take 1 tablet by mouth Daily.   • docusate sodium (Colace) 100 MG capsule Take 1 capsule by mouth 2 (Two) Times a Day.   • EPINEPHrine (EPIPEN) 0.3 MG/0.3ML solution auto-injector injection epinephrine 0.3 mg/0.3 mL injection, auto-injector   • estradiol (Vivelle-Dot) 0.1 MG/24HR patch Place 1  "patch on the skin as directed by provider 2 (Two) Times a Week.   • hydrOXYzine pamoate (VISTARIL) 25 MG capsule Take 25 mg by mouth 3 (Three) Times a Day As Needed.   • ibuprofen (ADVIL,MOTRIN) 800 MG tablet Take 1 tablet by mouth Every 8 (Eight) Hours As Needed for Mild Pain .   • lansoprazole (PREVACID) 30 MG capsule    • montelukast (SINGULAIR) 10 MG tablet Take 10 mg by mouth Every Night.   • ondansetron ODT (ZOFRAN-ODT) 4 MG disintegrating tablet Place 1 tablet under the tongue Every 6 (Six) Hours As Needed for Nausea or Vomiting.   • PROAIR  (90 Base) MCG/ACT inhaler Inhale 2 puffs Every 4 (Four) Hours As Needed.   • Spacer/Aero-Holding Chambers (AeroChamber Plus Ben-Vu) misc    • SUMAtriptan (IMITREX) 100 MG tablet    • venlafaxine XR (EFFEXOR-XR) 150 MG 24 hr capsule Take 150 mg by mouth Daily.     No current facility-administered medications on file prior to visit.     She is allergic to droperidol, penicillins, levofloxacin, peanut (diagnostic), adhesive tape, and shellfish allergy.    Social History    Tobacco Use      Smoking status: Current Every Day Smoker        Packs/day: 1.00        Types: Cigarettes      Smokeless tobacco: Never Used    Review of Systems  Consitutional POS: nothing reported    NEG: anorexia or night sweats   Gastointestinal POS: nothing reported    NEG: bloating, change in bowel habits, melena or reflux symptoms   Genitourinary POS: nothing reported    NEG: dysuria or hematuria   Integument POS: nothing reported    NEG: moles that are changing in size, shape, color or rashes   Breast POS: nothing reported    NEG: persistent breast lump, skin dimpling or nipple discharge         Pertinent items are noted in HPI.          Objective   /70   Ht 165.1 cm (65\")   Wt 74.8 kg (165 lb)   LMP 03/05/2021   BMI 27.46 kg/m²     General:  well developed; well nourished  no acute distress   Skin:  Not performed.   Thyroid: not examined   Lungs:  breathing is unlabored   Heart:  " Not performed.   Breasts:  Not performed.   Abdomen: soft, non-tender; no masses  no umbilical or inguinal hernias are present  no hepato-splenomegaly   Pelvis: Clinical staff was present for exam  External genitalia:  normal appearance of the external genitalia including Bartholin's and [place]'s glands.  :  urethral meatus normal;  Vaginal:  normal pink mucosa without prolapse or lesions.  Cervix:  absent.  Uterus:  absent.  Adnexa:  absent, bilateral.  Rectal:  digital rectal exam not performed; anus visually normal appearing.     Psychiatric: Alert and oriented ×3, mood and affect appropriate  HEENT: Atraumatic, normocephalic, normal scleral icterus  Extremities: 2+ pulses bilaterally, no edema      Lab Review   No data reviewed    Imaging   No data reviewed        Assessment   1. Cuff disruption post interocourse, cuff intact but raw irritated     Plan   1. [place] x 3 months  2. F/U 4 weeks  3. Flagyl BID x 7 days    No orders of the defined types were placed in this encounter.         This note was electronically signed.      June 30, 2021

## 2021-07-29 ENCOUNTER — OFFICE VISIT (OUTPATIENT)
Dept: OBSTETRICS AND GYNECOLOGY | Facility: CLINIC | Age: 36
End: 2021-07-29

## 2021-07-29 VITALS — SYSTOLIC BLOOD PRESSURE: 112 MMHG | BODY MASS INDEX: 26.79 KG/M2 | WEIGHT: 161 LBS | DIASTOLIC BLOOD PRESSURE: 72 MMHG

## 2021-07-29 DIAGNOSIS — N93.0 PCB (POST COITAL BLEEDING): Primary | ICD-10-CM

## 2021-07-29 PROCEDURE — 99212 OFFICE O/P EST SF 10 MIN: CPT | Performed by: OBSTETRICS & GYNECOLOGY

## 2021-07-29 NOTE — PROGRESS NOTES
Subjective   Chief Complaint   Patient presents with   • Follow-up     Tammi Collado is a 35 y.o. year old .  Patient's last menstrual period was 2021.  She presents to be seen because of cuff evaluation.  Patient has not had further intercourse and she has not had any bleeding.  She relates normal bowel bladder habits.  And overall has no complaints today.    OTHER COMPLAINTS:  Nothing else    The following portions of the patient's history were reviewed and updated as appropriate:  She  has a past medical history of Acid reflux, Allergy, Anxiety, Arthritis, Asthma, Bladder incontinence, Constipation, Cough, Depression, Depression, Difficulty walking, Kidney stones, Migraine, Pain with urination, PONV (postoperative nausea and vomiting), and Urinary frequency.  She does not have any pertinent problems on file.  She  has a past surgical history that includes Kidney stone surgery (); Oophorectomy (Right, ); Esophagogastroduodenoscopy; and total laparoscopic hysterectomy (Left, 3/25/2021).  Her family history includes Cystic fibrosis in her maternal aunt; Diabetes in her paternal grandmother; Hyperlipidemia in her paternal grandmother; Hypertension in her paternal grandmother; Ovarian cancer in her mother and paternal grandmother; Stroke in her paternal grandmother.  She  reports that she has been smoking cigarettes. She has been smoking about 1.00 pack per day. She has never used smokeless tobacco. She reports that she does not drink alcohol and does not use drugs.  Current Outpatient Medications   Medication Sig Dispense Refill   • acetaminophen (TYLENOL) 500 MG tablet Take 2 tablets by mouth Every 8 (Eight) Hours. Alternate with ibuprofen 60 tablet 0   • amitriptyline (ELAVIL) 25 MG tablet Take 25 mg by mouth Every Night.     • cyproheptadine (PERIACTIN) 4 MG tablet Take 1 tablet by mouth Daily. 30 tablet 5   • docusate sodium (Colace) 100 MG capsule Take 1 capsule by mouth 2 (Two) Times a Day.  60 capsule 0   • EPINEPHrine (EPIPEN) 0.3 MG/0.3ML solution auto-injector injection epinephrine 0.3 mg/0.3 mL injection, auto-injector     • estradiol (Vivelle-Dot) 0.1 MG/24HR patch Place 1 patch on the skin as directed by provider 2 (Two) Times a Week. 8 patch 12   • hydrOXYzine pamoate (VISTARIL) 25 MG capsule Take 25 mg by mouth 3 (Three) Times a Day As Needed.     • ibuprofen (ADVIL,MOTRIN) 800 MG tablet Take 1 tablet by mouth Every 8 (Eight) Hours As Needed for Mild Pain . 40 tablet 0   • lansoprazole (PREVACID) 30 MG capsule      • montelukast (SINGULAIR) 10 MG tablet Take 10 mg by mouth Every Night.  0   • ondansetron ODT (ZOFRAN-ODT) 4 MG disintegrating tablet Place 1 tablet under the tongue Every 6 (Six) Hours As Needed for Nausea or Vomiting. 10 tablet 0   • PROAIR  (90 Base) MCG/ACT inhaler Inhale 2 puffs Every 4 (Four) Hours As Needed.  0   • Spacer/Aero-Holding Chambers (AeroChamber Plus Ben-Vu) misc      • SUMAtriptan (IMITREX) 100 MG tablet   0   • venlafaxine XR (EFFEXOR-XR) 150 MG 24 hr capsule Take 150 mg by mouth Daily.  0     No current facility-administered medications for this visit.     Current Outpatient Medications on File Prior to Visit   Medication Sig   • acetaminophen (TYLENOL) 500 MG tablet Take 2 tablets by mouth Every 8 (Eight) Hours. Alternate with ibuprofen   • amitriptyline (ELAVIL) 25 MG tablet Take 25 mg by mouth Every Night.   • cyproheptadine (PERIACTIN) 4 MG tablet Take 1 tablet by mouth Daily.   • docusate sodium (Colace) 100 MG capsule Take 1 capsule by mouth 2 (Two) Times a Day.   • EPINEPHrine (EPIPEN) 0.3 MG/0.3ML solution auto-injector injection epinephrine 0.3 mg/0.3 mL injection, auto-injector   • estradiol (Vivelle-Dot) 0.1 MG/24HR patch Place 1 patch on the skin as directed by provider 2 (Two) Times a Week.   • hydrOXYzine pamoate (VISTARIL) 25 MG capsule Take 25 mg by mouth 3 (Three) Times a Day As Needed.   • ibuprofen (ADVIL,MOTRIN) 800 MG tablet Take 1  tablet by mouth Every 8 (Eight) Hours As Needed for Mild Pain .   • lansoprazole (PREVACID) 30 MG capsule    • montelukast (SINGULAIR) 10 MG tablet Take 10 mg by mouth Every Night.   • ondansetron ODT (ZOFRAN-ODT) 4 MG disintegrating tablet Place 1 tablet under the tongue Every 6 (Six) Hours As Needed for Nausea or Vomiting.   • PROAIR  (90 Base) MCG/ACT inhaler Inhale 2 puffs Every 4 (Four) Hours As Needed.   • Spacer/Aero-Holding Chambers (AeroChamber Plus Ben-Vu) misc    • SUMAtriptan (IMITREX) 100 MG tablet    • venlafaxine XR (EFFEXOR-XR) 150 MG 24 hr capsule Take 150 mg by mouth Daily.     No current facility-administered medications on file prior to visit.     She is allergic to droperidol, penicillins, levofloxacin, peanut (diagnostic), adhesive tape, and shellfish allergy.    Social History    Tobacco Use      Smoking status: Current Every Day Smoker        Packs/day: 1.00        Types: Cigarettes      Smokeless tobacco: Never Used    Review of Systems  Consitutional POS: nothing reported    NEG: anorexia or night sweats   Gastointestinal POS: nothing reported    NEG: bloating, change in bowel habits, melena or reflux symptoms   Genitourinary POS: nothing reported    NEG: dysuria or hematuria   Integument POS: nothing reported    NEG: moles that are changing in size, shape, color or rashes   Breast POS: nothing reported    NEG: persistent breast lump, skin dimpling or nipple discharge         Respiratory: negative  Cardiovascular: negative          Objective   /72   Wt 73 kg (161 lb)   LMP 03/05/2021   BMI 26.79 kg/m²     General:  well developed; well nourished  no acute distress   Skin:  Not performed.   Thyroid: not examined   Lungs:  breathing is unlabored   Heart:  Not performed.   Breasts:  Not performed.   Abdomen: soft, non-tender; no masses  no umbilical or inguinal hernias are present  no hepato-splenomegaly   Pelvis: Clinical staff was present for exam  External genitalia:  normal  appearance of the external genitalia including Bartholin's and [place]'s glands.  cuff intact and well healed     Psychiatric: Alert and oriented ×3, mood and affect appropriate  HEENT: Atraumatic, normocephalic, normal scleral icterus  Extremities: 2+ pulses bilaterally, no edema      Lab Review   No data reviewed    Imaging   No data reviewed        Assessment   1. Well healed cuff  2. Doing well, normal B/B     Plan   1. Reassurance given to patient that the cuff is fully healed.  Her exam is normal.  She may resume intercourse.  2. Follow-up annually.  Patient encouraged to call if she has any problems.    No orders of the defined types were placed in this encounter.         This note was electronically signed.      July 29, 2021

## 2021-09-01 ENCOUNTER — OFFICE VISIT (OUTPATIENT)
Dept: OBSTETRICS AND GYNECOLOGY | Facility: CLINIC | Age: 36
End: 2021-09-01

## 2021-09-01 VITALS
BODY MASS INDEX: 25.49 KG/M2 | DIASTOLIC BLOOD PRESSURE: 68 MMHG | HEIGHT: 65 IN | WEIGHT: 153 LBS | SYSTOLIC BLOOD PRESSURE: 116 MMHG

## 2021-09-01 DIAGNOSIS — R10.2 PELVIC PAIN: Primary | ICD-10-CM

## 2021-09-01 PROCEDURE — 99213 OFFICE O/P EST LOW 20 MIN: CPT | Performed by: OBSTETRICS & GYNECOLOGY

## 2021-09-01 RX ORDER — METRONIDAZOLE 500 MG/1
500 TABLET ORAL 2 TIMES DAILY
Qty: 14 TABLET | Refills: 0 | Status: SHIPPED | OUTPATIENT
Start: 2021-09-01 | End: 2021-09-08

## 2021-09-01 NOTE — PROGRESS NOTES
Subjective   Chief Complaint   Patient presents with   • Follow-up     Patient c/o bleeding ( last Friday) spotting 2021. Hysterectomy on 3/25/2021      Tammi Collado is a 35 y.o. year old .  Patient's last menstrual period was 2021.  She presents to be seen because of  vaignal spotting after intercourse last weekend. Vaginal dryness.     OTHER COMPLAINTS:  Nothing else    The following portions of the patient's history were reviewed and updated as appropriate:  She  has a past medical history of Acid reflux, Allergy, Anxiety, Arthritis, Asthma, Bladder incontinence, Constipation, Cough, Depression, Depression, Difficulty walking, Kidney stones, Migraine, Pain with urination, PONV (postoperative nausea and vomiting), and Urinary frequency.  She does not have any pertinent problems on file.  She  has a past surgical history that includes Kidney stone surgery (); Oophorectomy (Right, ); Esophagogastroduodenoscopy; and total laparoscopic hysterectomy (Left, 3/25/2021).  Her family history includes Cystic fibrosis in her maternal aunt; Diabetes in her paternal grandmother; Hyperlipidemia in her paternal grandmother; Hypertension in her paternal grandmother; Ovarian cancer in her mother and paternal grandmother; Stroke in her paternal grandmother.  She  reports that she has been smoking cigarettes. She has been smoking about 1.00 pack per day. She has never used smokeless tobacco. She reports that she does not drink alcohol and does not use drugs.  Current Outpatient Medications   Medication Sig Dispense Refill   • acetaminophen (TYLENOL) 500 MG tablet Take 2 tablets by mouth Every 8 (Eight) Hours. Alternate with ibuprofen 60 tablet 0   • EPINEPHrine (EPIPEN) 0.3 MG/0.3ML solution auto-injector injection epinephrine 0.3 mg/0.3 mL injection, auto-injector     • hydrOXYzine pamoate (VISTARIL) 25 MG capsule Take 25 mg by mouth 3 (Three) Times a Day As Needed.     • ibuprofen (ADVIL,MOTRIN) 800 MG  tablet Take 1 tablet by mouth Every 8 (Eight) Hours As Needed for Mild Pain . 40 tablet 0   • montelukast (SINGULAIR) 10 MG tablet Take 10 mg by mouth Every Night.  0   • ondansetron ODT (ZOFRAN-ODT) 4 MG disintegrating tablet Place 1 tablet under the tongue Every 6 (Six) Hours As Needed for Nausea or Vomiting. 10 tablet 0   • PROAIR  (90 Base) MCG/ACT inhaler Inhale 2 puffs Every 4 (Four) Hours As Needed.  0   • Spacer/Aero-Holding Chambers (AeroChamber Plus Ben-Vu) misc      • venlafaxine XR (EFFEXOR-XR) 150 MG 24 hr capsule Take 150 mg by mouth Daily.  0   • amitriptyline (ELAVIL) 25 MG tablet Take 25 mg by mouth Every Night.     • cyproheptadine (PERIACTIN) 4 MG tablet Take 1 tablet by mouth Daily. 30 tablet 5   • docusate sodium (Colace) 100 MG capsule Take 1 capsule by mouth 2 (Two) Times a Day. 60 capsule 0   • estradiol (Vivelle-Dot) 0.1 MG/24HR patch Place 1 patch on the skin as directed by provider 2 (Two) Times a Week. 8 patch 12   • lansoprazole (PREVACID) 30 MG capsule      • SUMAtriptan (IMITREX) 100 MG tablet   0     No current facility-administered medications for this visit.     Current Outpatient Medications on File Prior to Visit   Medication Sig   • acetaminophen (TYLENOL) 500 MG tablet Take 2 tablets by mouth Every 8 (Eight) Hours. Alternate with ibuprofen   • EPINEPHrine (EPIPEN) 0.3 MG/0.3ML solution auto-injector injection epinephrine 0.3 mg/0.3 mL injection, auto-injector   • hydrOXYzine pamoate (VISTARIL) 25 MG capsule Take 25 mg by mouth 3 (Three) Times a Day As Needed.   • ibuprofen (ADVIL,MOTRIN) 800 MG tablet Take 1 tablet by mouth Every 8 (Eight) Hours As Needed for Mild Pain .   • montelukast (SINGULAIR) 10 MG tablet Take 10 mg by mouth Every Night.   • ondansetron ODT (ZOFRAN-ODT) 4 MG disintegrating tablet Place 1 tablet under the tongue Every 6 (Six) Hours As Needed for Nausea or Vomiting.   • PROAIR  (90 Base) MCG/ACT inhaler Inhale 2 puffs Every 4 (Four) Hours As  "Needed.   • Spacer/Aero-Holding Chambers (AeroChamber Plus Ben-Vu) misc    • venlafaxine XR (EFFEXOR-XR) 150 MG 24 hr capsule Take 150 mg by mouth Daily.   • amitriptyline (ELAVIL) 25 MG tablet Take 25 mg by mouth Every Night.   • cyproheptadine (PERIACTIN) 4 MG tablet Take 1 tablet by mouth Daily.   • docusate sodium (Colace) 100 MG capsule Take 1 capsule by mouth 2 (Two) Times a Day.   • estradiol (Vivelle-Dot) 0.1 MG/24HR patch Place 1 patch on the skin as directed by provider 2 (Two) Times a Week.   • lansoprazole (PREVACID) 30 MG capsule    • SUMAtriptan (IMITREX) 100 MG tablet      No current facility-administered medications on file prior to visit.     She is allergic to droperidol, penicillins, levofloxacin, peanut (diagnostic), adhesive tape, and shellfish allergy.    Social History    Tobacco Use      Smoking status: Current Every Day Smoker        Packs/day: 1.00        Types: Cigarettes      Smokeless tobacco: Never Used    Review of Systems  Consitutional POS: nothing reported    NEG: anorexia or night sweats   Gastointestinal POS: nothing reported    NEG: bloating, change in bowel habits, melena or reflux symptoms   Genitourinary POS: nothing reported    NEG: dysuria or hematuria   Integument POS: nothing reported    NEG: moles that are changing in size, shape, color or rashes   Breast POS: nothing reported    NEG: persistent breast lump, skin dimpling or nipple discharge         Respiratory: negative  Cardiovascular: negative          Objective   /68   Ht 165.1 cm (65\")   Wt 69.4 kg (153 lb)   LMP 03/05/2021   Breastfeeding No   BMI 25.46 kg/m²     General:  well developed; well nourished  no acute distress   Skin:  Not performed.   Thyroid: not examined   Lungs:  breathing is unlabored  clear to auscultation bilaterally   Heart:  Not performed.   Breasts:  Examined in supine position  Symmetric without masses or skin dimpling  Nipples normal without inversion, lesions or discharge  There " are no palpable axillary nodes   Abdomen: soft, non-tender; no masses  no umbilical or inguinal hernias are present  no hepato-splenomegaly   Pelvis: Clinical staff was present for exam  External genitalia:  normal appearance of the external genitalia including [place]th[place]'s and [place]'s glands.  :  urethral meatus normal;  Vaginal:  normal pink mucosa without prolapse or lesions.  Cervix:  absent.  Uterus:  absent.  Adnexa:  absent, bilateral.  Rectal:  digital rectal exam not performed; anus visually normal appearing.     Psychiatric: Alert and oriented ×3, mood and affect appropriate  HEENT: Atraumatic, normocephalic, normal scleral icterus  Extremities: 2+ pulses bilaterally, no edema      Lab Review   Tissue Pathology Exam (03/25/2021 08:52)      Imaging   No data reviewed           Assessment   1. Recurrent cuff disruption- raw area superiorly on cuff noted again 1cm x 1cm     Plan   1. Flagyll 500mg po BID x  7days  2. Start Vivelle dot patch 0.1 2 x week  3. F/U 3 weeks  4. STD screening  No orders of the defined types were placed in this encounter.         This note was electronically signed.      September 1, 2021

## 2021-09-03 LAB
A VAGINAE DNA VAG QL NAA+PROBE: ABNORMAL SCORE
BVAB2 DNA VAG QL NAA+PROBE: ABNORMAL SCORE
C ALBICANS DNA VAG QL NAA+PROBE: NEGATIVE
C GLABRATA DNA VAG QL NAA+PROBE: NEGATIVE
C TRACH DNA VAG QL NAA+PROBE: NEGATIVE
MEGA1 DNA VAG QL NAA+PROBE: ABNORMAL SCORE
N GONORRHOEA DNA VAG QL NAA+PROBE: NEGATIVE
T VAGINALIS DNA VAG QL NAA+PROBE: NEGATIVE

## 2021-09-21 ENCOUNTER — OFFICE VISIT (OUTPATIENT)
Dept: OBSTETRICS AND GYNECOLOGY | Facility: CLINIC | Age: 36
End: 2021-09-21

## 2021-09-21 VITALS
WEIGHT: 152 LBS | BODY MASS INDEX: 25.33 KG/M2 | SYSTOLIC BLOOD PRESSURE: 124 MMHG | HEIGHT: 65 IN | DIASTOLIC BLOOD PRESSURE: 68 MMHG

## 2021-09-21 DIAGNOSIS — A58 VAGINAL CUFF GRANULOMA: Primary | ICD-10-CM

## 2021-09-21 PROCEDURE — 99213 OFFICE O/P EST LOW 20 MIN: CPT | Performed by: OBSTETRICS & GYNECOLOGY

## 2021-09-21 NOTE — PROGRESS NOTES
Subjective   Chief Complaint   Patient presents with   • Follow-up     3 week follow bleeding      Tammi Collado is a 35 y.o. year old .  Patient's last menstrual period was 2021.  She presents to be seen because of cuff separation post intercourse. Finished Flagyl. Currently on vivelle dot 0.1 patch.  Patient overall doing well.  She has had no vaginal bleeding.  She is continuing pelvic rest.    OTHER COMPLAINTS:  Nothing else    The following portions of the patient's history were reviewed and updated as appropriate:  She  has a past medical history of Acid reflux, Allergy, Anxiety, Arthritis, Asthma, Bladder incontinence, Constipation, Cough, Depression, Depression, Difficulty walking, Kidney stones, Migraine, Pain with urination, PONV (postoperative nausea and vomiting), and Urinary frequency.  She does not have any pertinent problems on file.  She  has a past surgical history that includes Kidney stone surgery (); Oophorectomy (Right, ); Esophagogastroduodenoscopy; and total laparoscopic hysterectomy (Left, 3/25/2021).  Her family history includes Cystic fibrosis in her maternal aunt; Diabetes in her paternal grandmother; Hyperlipidemia in her paternal grandmother; Hypertension in her paternal grandmother; Ovarian cancer in her mother and paternal grandmother; Stroke in her paternal grandmother.  She  reports that she has been smoking cigarettes. She has been smoking about 1.00 pack per day. She has never used smokeless tobacco. She reports that she does not drink alcohol and does not use drugs.  Current Outpatient Medications   Medication Sig Dispense Refill   • acetaminophen (TYLENOL) 500 MG tablet Take 2 tablets by mouth Every 8 (Eight) Hours. Alternate with ibuprofen 60 tablet 0   • amitriptyline (ELAVIL) 25 MG tablet Take 25 mg by mouth Every Night.     • cyproheptadine (PERIACTIN) 4 MG tablet Take 1 tablet by mouth Daily. 30 tablet 5   • docusate sodium (Colace) 100 MG capsule Take 1  capsule by mouth 2 (Two) Times a Day. 60 capsule 0   • EPINEPHrine (EPIPEN) 0.3 MG/0.3ML solution auto-injector injection epinephrine 0.3 mg/0.3 mL injection, auto-injector     • estradiol (Vivelle-Dot) 0.1 MG/24HR patch Place 1 patch on the skin as directed by provider 2 (Two) Times a Week. 8 patch 12   • hydrOXYzine pamoate (VISTARIL) 25 MG capsule Take 25 mg by mouth 3 (Three) Times a Day As Needed.     • ibuprofen (ADVIL,MOTRIN) 800 MG tablet Take 1 tablet by mouth Every 8 (Eight) Hours As Needed for Mild Pain . 40 tablet 0   • lansoprazole (PREVACID) 30 MG capsule      • montelukast (SINGULAIR) 10 MG tablet Take 10 mg by mouth Every Night.  0   • ondansetron ODT (ZOFRAN-ODT) 4 MG disintegrating tablet Place 1 tablet under the tongue Every 6 (Six) Hours As Needed for Nausea or Vomiting. 10 tablet 0   • PROAIR  (90 Base) MCG/ACT inhaler Inhale 2 puffs Every 4 (Four) Hours As Needed.  0   • Spacer/Aero-Holding Chambers (AeroChamber Plus Ben-Vu) misc      • SUMAtriptan (IMITREX) 100 MG tablet   0   • venlafaxine XR (EFFEXOR-XR) 150 MG 24 hr capsule Take 150 mg by mouth Daily.  0     No current facility-administered medications for this visit.     Current Outpatient Medications on File Prior to Visit   Medication Sig   • acetaminophen (TYLENOL) 500 MG tablet Take 2 tablets by mouth Every 8 (Eight) Hours. Alternate with ibuprofen   • amitriptyline (ELAVIL) 25 MG tablet Take 25 mg by mouth Every Night.   • cyproheptadine (PERIACTIN) 4 MG tablet Take 1 tablet by mouth Daily.   • docusate sodium (Colace) 100 MG capsule Take 1 capsule by mouth 2 (Two) Times a Day.   • EPINEPHrine (EPIPEN) 0.3 MG/0.3ML solution auto-injector injection epinephrine 0.3 mg/0.3 mL injection, auto-injector   • estradiol (Vivelle-Dot) 0.1 MG/24HR patch Place 1 patch on the skin as directed by provider 2 (Two) Times a Week.   • hydrOXYzine pamoate (VISTARIL) 25 MG capsule Take 25 mg by mouth 3 (Three) Times a Day As Needed.   • ibuprofen  "(ADVIL,MOTRIN) 800 MG tablet Take 1 tablet by mouth Every 8 (Eight) Hours As Needed for Mild Pain .   • lansoprazole (PREVACID) 30 MG capsule    • montelukast (SINGULAIR) 10 MG tablet Take 10 mg by mouth Every Night.   • ondansetron ODT (ZOFRAN-ODT) 4 MG disintegrating tablet Place 1 tablet under the tongue Every 6 (Six) Hours As Needed for Nausea or Vomiting.   • PROAIR  (90 Base) MCG/ACT inhaler Inhale 2 puffs Every 4 (Four) Hours As Needed.   • Spacer/Aero-Holding Chambers (AeroChamber Plus Ben-Vu) misc    • SUMAtriptan (IMITREX) 100 MG tablet    • venlafaxine XR (EFFEXOR-XR) 150 MG 24 hr capsule Take 150 mg by mouth Daily.     No current facility-administered medications on file prior to visit.     She is allergic to droperidol, penicillins, levofloxacin, peanut (diagnostic), adhesive tape, and shellfish allergy.    Social History    Tobacco Use      Smoking status: Current Every Day Smoker        Packs/day: 1.00        Types: Cigarettes      Smokeless tobacco: Never Used    Review of Systems  Consitutional POS: nothing reported    NEG: anorexia or night sweats   Gastointestinal POS: nothing reported    NEG: bloating, change in bowel habits, melena or reflux symptoms   Genitourinary POS: nothing reported    NEG: dysuria or hematuria   Integument POS: nothing reported    NEG: moles that are changing in size, shape, color or rashes   Breast POS: nothing reported    NEG: persistent breast lump, skin dimpling or nipple discharge         Respiratory: negative  Cardiovascular: negative          Objective   /68   Ht 165.1 cm (65\")   Wt 68.9 kg (152 lb)   LMP 03/05/2021   BMI 25.29 kg/m²     General:  well developed; well nourished  no acute distress   Skin:  Not performed.   Thyroid: not examined   Lungs:  breathing is unlabored   Heart:  Not performed.   Breasts:  Not performed.   Abdomen: soft, non-tender; no masses  no umbilical or inguinal hernias are present  no hepato-splenomegaly   Pelvis: " Clinical staff was present for exam  External genitalia:  normal appearance of the external genitalia including Bartholin's and [place]'s glands.  :  urethral meatus normal;  Vaginal:  normal pink mucosa without prolapse or lesions.  Cervix:  absent.  Uterus:  absent.  Adnexa:  absent, bilateral.  Rectal:  digital rectal exam not performed; anus visually normal appearing.  Very tiny less than 1 mm area of irritation superiorly at the cuff     Psychiatric: Alert and oriented ×3, mood and affect appropriate  HEENT: Atraumatic, normocephalic, normal scleral icterus  Extremities: 2+ pulses bilaterally, no edema      Lab Review   No data reviewed    Imaging   No data reviewed        Assessment   1. Postcoital vaginal cuff disruption: Overall the cuff looks 99% healed today.  This is over the course of 3 weeks since last being seen.  More than likely is related to intercourse but also the fact the patient was not on any hormones and just had a sensitivity to the vaginal tissue for a lack of estrogen.     Plan   1. Continue Vivelle-Dot patch  2. Pelvic rest strict for 3 months  3. Follow-up in 3 months  4.     No orders of the defined types were placed in this encounter.         This note was electronically signed.      September 21, 2021

## 2021-10-13 ENCOUNTER — TELEPHONE (OUTPATIENT)
Dept: OBSTETRICS AND GYNECOLOGY | Facility: CLINIC | Age: 36
End: 2021-10-13

## 2021-10-13 RX ORDER — ESTRADIOL 0.1 MG/D
1 FILM, EXTENDED RELEASE TRANSDERMAL 2 TIMES WEEKLY
Qty: 24 PATCH | Refills: 4 | Status: SHIPPED | OUTPATIENT
Start: 2021-10-14 | End: 2022-10-25 | Stop reason: SDUPTHER

## 2021-10-13 NOTE — TELEPHONE ENCOUNTER
Spoke with patient, she would like to stay on patch. She will need a 90 day supply sent to [facility]    Thank You

## 2021-10-13 NOTE — TELEPHONE ENCOUNTER
----- Message from Dionna Paredes sent at 10/12/2021  4:27 PM EDT -----  Pt changes her Estradiol patch 2x weekly. She would like to have a change to 3x a wk due to her using the tanning bed.     RX: [facility]     Dr Montaño's pt

## 2021-10-13 NOTE — TELEPHONE ENCOUNTER
That is too much estrogen.  If she is having issues due to the tanning bed with the adhesive in the patch sticking then we can change her over to an oral formulation.

## 2021-12-14 ENCOUNTER — OFFICE VISIT (OUTPATIENT)
Dept: OBSTETRICS AND GYNECOLOGY | Facility: CLINIC | Age: 36
End: 2021-12-14

## 2021-12-14 VITALS
SYSTOLIC BLOOD PRESSURE: 118 MMHG | BODY MASS INDEX: 26.82 KG/M2 | WEIGHT: 161 LBS | DIASTOLIC BLOOD PRESSURE: 64 MMHG | HEIGHT: 65 IN

## 2021-12-14 DIAGNOSIS — N76.0 VAGINAL CUFF CELLULITIS: ICD-10-CM

## 2021-12-14 DIAGNOSIS — R39.15 URINARY URGENCY: Primary | ICD-10-CM

## 2021-12-14 PROCEDURE — 99213 OFFICE O/P EST LOW 20 MIN: CPT | Performed by: OBSTETRICS & GYNECOLOGY

## 2021-12-14 RX ORDER — CYCLOBENZAPRINE HCL 10 MG
TABLET ORAL
COMMUNITY
Start: 2021-11-03

## 2021-12-14 RX ORDER — BUTALBITAL, ACETAMINOPHEN AND CAFFEINE 50; 325; 40 MG/1; MG/1; MG/1
TABLET ORAL
COMMUNITY
Start: 2021-12-02

## 2021-12-14 RX ORDER — CLONAZEPAM 1 MG/1
1 TABLET ORAL 3 TIMES DAILY PRN
COMMUNITY
Start: 2021-12-02

## 2021-12-14 NOTE — PROGRESS NOTES
Subjective   Chief Complaint   Patient presents with   • Follow-up     Patient complains of urinary frequency also follow up for hysterectomy      Tammi Collado is a 36 y.o. year old .  Patient's last menstrual period was 2021.  She presents to be seen because of cuff f/u and now c/o urinary freqency though only occurring once or twice a week.  Patient has been sexually active though not vigorously as such.  She has had no vaginal bleeding or discharge.  She is doing her Vivelle-Dot patch 0.1 mg twice a week as prescribed..     OTHER COMPLAINTS:  Nothing else    The following portions of the patient's history were reviewed and updated as appropriate:  She  has a past medical history of Acid reflux, Allergy, Anxiety, Arthritis, Asthma, Bladder incontinence, Constipation, Cough, Depression, Depression, Difficulty walking, Kidney stones, Migraine, Pain with urination, PONV (postoperative nausea and vomiting), and Urinary frequency.  She does not have any pertinent problems on file.  She  has a past surgical history that includes Kidney stone surgery (); Oophorectomy (Right, ); Esophagogastroduodenoscopy; and total laparoscopic hysterectomy (Left, 3/25/2021).  Her family history includes Cystic fibrosis in her maternal aunt; Diabetes in her paternal grandmother; Hyperlipidemia in her paternal grandmother; Hypertension in her paternal grandmother; Ovarian cancer in her mother and paternal grandmother; Stroke in her paternal grandmother.  She  reports that she has been smoking cigarettes. She has been smoking about 1.00 pack per day. She has never used smokeless tobacco. She reports that she does not drink alcohol and does not use drugs.  Current Outpatient Medications   Medication Sig Dispense Refill   • acetaminophen (TYLENOL) 500 MG tablet Take 2 tablets by mouth Every 8 (Eight) Hours. Alternate with ibuprofen 60 tablet 0   • amitriptyline (ELAVIL) 25 MG tablet Take 25 mg by mouth Every Night.     •  cyproheptadine (PERIACTIN) 4 MG tablet Take 1 tablet by mouth Daily. 30 tablet 5   • docusate sodium (Colace) 100 MG capsule Take 1 capsule by mouth 2 (Two) Times a Day. 60 capsule 0   • EPINEPHrine (EPIPEN) 0.3 MG/0.3ML solution auto-injector injection epinephrine 0.3 mg/0.3 mL injection, auto-injector     • estradiol (Vivelle-Dot) 0.1 MG/24HR patch Place 1 patch on the skin as directed by provider 2 (Two) Times a Week. 24 patch 4   • hydrOXYzine pamoate (VISTARIL) 25 MG capsule Take 25 mg by mouth 3 (Three) Times a Day As Needed.     • ibuprofen (ADVIL,MOTRIN) 800 MG tablet Take 1 tablet by mouth Every 8 (Eight) Hours As Needed for Mild Pain . 40 tablet 0   • lansoprazole (PREVACID) 30 MG capsule      • montelukast (SINGULAIR) 10 MG tablet Take 10 mg by mouth Every Night.  0   • ondansetron ODT (ZOFRAN-ODT) 4 MG disintegrating tablet Place 1 tablet under the tongue Every 6 (Six) Hours As Needed for Nausea or Vomiting. 10 tablet 0   • PROAIR  (90 Base) MCG/ACT inhaler Inhale 2 puffs Every 4 (Four) Hours As Needed.  0   • Spacer/Aero-Holding Chambers (AeroChamber Plus Ben-Vu) misc      • SUMAtriptan (IMITREX) 100 MG tablet   0   • venlafaxine XR (EFFEXOR-XR) 150 MG 24 hr capsule Take 150 mg by mouth Daily.  0   • butalbital-acetaminophen-caffeine (FIORICET, ESGIC) -40 MG per tablet TAKE 1 TABLET BY MOUTH TWICE DAILY AS NEEDED FOR HEADACHE     • clonazePAM (KlonoPIN) 1 MG tablet Take 1 mg by mouth 3 (Three) Times a Day As Needed. for anxiety     • cyclobenzaprine (FLEXERIL) 10 MG tablet        No current facility-administered medications for this visit.     Current Outpatient Medications on File Prior to Visit   Medication Sig   • acetaminophen (TYLENOL) 500 MG tablet Take 2 tablets by mouth Every 8 (Eight) Hours. Alternate with ibuprofen   • amitriptyline (ELAVIL) 25 MG tablet Take 25 mg by mouth Every Night.   • cyproheptadine (PERIACTIN) 4 MG tablet Take 1 tablet by mouth Daily.   • docusate sodium  (Colace) 100 MG capsule Take 1 capsule by mouth 2 (Two) Times a Day.   • EPINEPHrine (EPIPEN) 0.3 MG/0.3ML solution auto-injector injection epinephrine 0.3 mg/0.3 mL injection, auto-injector   • estradiol (Vivelle-Dot) 0.1 MG/24HR patch Place 1 patch on the skin as directed by provider 2 (Two) Times a Week.   • hydrOXYzine pamoate (VISTARIL) 25 MG capsule Take 25 mg by mouth 3 (Three) Times a Day As Needed.   • ibuprofen (ADVIL,MOTRIN) 800 MG tablet Take 1 tablet by mouth Every 8 (Eight) Hours As Needed for Mild Pain .   • lansoprazole (PREVACID) 30 MG capsule    • montelukast (SINGULAIR) 10 MG tablet Take 10 mg by mouth Every Night.   • ondansetron ODT (ZOFRAN-ODT) 4 MG disintegrating tablet Place 1 tablet under the tongue Every 6 (Six) Hours As Needed for Nausea or Vomiting.   • PROAIR  (90 Base) MCG/ACT inhaler Inhale 2 puffs Every 4 (Four) Hours As Needed.   • Spacer/Aero-Holding Chambers (AeroChamber Plus Ben-Vu) misc    • SUMAtriptan (IMITREX) 100 MG tablet    • venlafaxine XR (EFFEXOR-XR) 150 MG 24 hr capsule Take 150 mg by mouth Daily.   • butalbital-acetaminophen-caffeine (FIORICET, ESGIC) -40 MG per tablet TAKE 1 TABLET BY MOUTH TWICE DAILY AS NEEDED FOR HEADACHE   • clonazePAM (KlonoPIN) 1 MG tablet Take 1 mg by mouth 3 (Three) Times a Day As Needed. for anxiety   • cyclobenzaprine (FLEXERIL) 10 MG tablet      No current facility-administered medications on file prior to visit.     She is allergic to droperidol, penicillins, levofloxacin, peanut (diagnostic), adhesive tape, and shellfish allergy.    Social History    Tobacco Use      Smoking status: Current Every Day Smoker        Packs/day: 1.00        Types: Cigarettes      Smokeless tobacco: Never Used    Review of Systems  Consitutional POS: nothing reported    NEG: anorexia or night sweats   Gastointestinal POS: nothing reported    NEG: bloating, change in bowel habits, melena or reflux symptoms   Genitourinary POS: nothing reported     "NEG: dysuria or hematuria   Integument POS: nothing reported    NEG: moles that are changing in size, shape, color or rashes   Breast POS: nothing reported    NEG: persistent breast lump, skin dimpling or nipple discharge         Respiratory: negative  Cardiovascular: negative          Objective   /64   Ht 165.1 cm (65\")   Wt 73 kg (161 lb)   LMP 03/05/2021   BMI 26.79 kg/m²     General:  well developed; well nourished  no acute distress   Skin:  Not performed.   Thyroid: not examined   Lungs:  breathing is unlabored   Heart:  Not performed.   Breasts:  Not performed.   Abdomen: soft, non-tender; no masses  no umbilical or inguinal hernias are present  no hepato-splenomegaly   Pelvis: Clinical staff was present for exam  External genitalia:  normal appearance of the external genitalia including Bartholin's and [place]'s glands.  :  urethral meatus normal;  Vaginal:  normal pink mucosa without prolapse or lesions.  Cervix:  absent.  Uterus:  absent.  Adnexa:  absent, bilateral.  Rectal:  digital rectal exam not performed; anus visually normal appearing.     Psychiatric: Alert and oriented ×3, mood and affect appropriate  HEENT: Atraumatic, normocephalic, normal scleral icterus  Extremities: 2+ pulses bilaterally, no edema      Lab Review   No data reviewed    Imaging   No data reviewed        Assessment   1. Vagina and cuff are normal in appearance.  There is no evidence even of an incision/suture line at the vaginal cuff.  There is no significant discharge.     Plan   1. Cont vivelle dot 0.1 patch  2. May resume intercourse fully  3. Kegel's 100 a day  4. As urinary urgency is only once or twice a week would encourage bladder training and Kegel's.  Certainly should the frequency increase she can contact me.  Wise follow-up 1 year    No orders of the defined types were placed in this encounter.         This note was electronically signed.      December 14, 2021      "

## 2022-10-25 ENCOUNTER — TELEPHONE (OUTPATIENT)
Dept: OBSTETRICS AND GYNECOLOGY | Facility: CLINIC | Age: 37
End: 2022-10-25

## 2022-10-25 RX ORDER — ESTRADIOL 0.1 MG/D
1 FILM, EXTENDED RELEASE TRANSDERMAL 2 TIMES WEEKLY
Qty: 24 PATCH | Refills: 4 | Status: SHIPPED | OUTPATIENT
Start: 2022-10-27 | End: 2022-10-26 | Stop reason: SDUPTHER

## 2022-10-25 NOTE — TELEPHONE ENCOUNTER
----- Message from Laila Marin sent at 10/25/2022  2:57 PM EDT -----  Patient needs her estradiol patch refilled. She also wanted to know about getting something to help with vaginal dryness, she has tried otc creams that have not helped.    [facility]

## 2022-10-26 ENCOUNTER — TELEPHONE (OUTPATIENT)
Dept: OBSTETRICS AND GYNECOLOGY | Facility: CLINIC | Age: 37
End: 2022-10-26

## 2022-10-26 RX ORDER — ESTRADIOL 10 UG/1
1 INSERT VAGINAL 2 TIMES WEEKLY
Qty: 8 TABLET | Refills: 12 | Status: SHIPPED | OUTPATIENT
Start: 2022-10-27 | End: 2022-12-07 | Stop reason: SDUPTHER

## 2022-10-26 RX ORDER — ESTRADIOL 0.1 MG/D
1 FILM, EXTENDED RELEASE TRANSDERMAL 2 TIMES WEEKLY
Qty: 24 PATCH | Refills: 4 | Status: SHIPPED | OUTPATIENT
Start: 2022-10-27 | End: 2022-12-07 | Stop reason: SDUPTHER

## 2022-10-26 RX ORDER — ESTRADIOL 10 UG/1
1 INSERT VAGINAL 2 TIMES WEEKLY
Qty: 8 TABLET | Refills: 12 | Status: SHIPPED | OUTPATIENT
Start: 2022-10-27 | End: 2022-10-26 | Stop reason: SDUPTHER

## 2022-10-26 NOTE — TELEPHONE ENCOUNTER
----- Message from Geovany Thrasher sent at 10/26/2022  1:50 PM EDT -----  PATIENT IS REQUESTING SOMETHING FOR VAGINAL DRYNESS. SHE HAS TRIED MULTIPLE OTC CREAMS AND LUBRICATIONS WITH NO RELIEF.  PATIENT IS ASKING IF SHE WOULD BE ABLE TO TRY ESTRADIOL VAGINAL INSERTS.      [facility]

## 2022-12-07 ENCOUNTER — OFFICE VISIT (OUTPATIENT)
Dept: OBSTETRICS AND GYNECOLOGY | Facility: CLINIC | Age: 37
End: 2022-12-07

## 2022-12-07 VITALS — SYSTOLIC BLOOD PRESSURE: 102 MMHG | BODY MASS INDEX: 26.99 KG/M2 | WEIGHT: 162.2 LBS | DIASTOLIC BLOOD PRESSURE: 68 MMHG

## 2022-12-07 DIAGNOSIS — Z12.31 ENCOUNTER FOR SCREENING MAMMOGRAM FOR MALIGNANT NEOPLASM OF BREAST: Primary | ICD-10-CM

## 2022-12-07 DIAGNOSIS — Z01.419 ENCOUNTER FOR GYNECOLOGICAL EXAMINATION WITHOUT ABNORMAL FINDING: ICD-10-CM

## 2022-12-07 PROCEDURE — 99395 PREV VISIT EST AGE 18-39: CPT | Performed by: OBSTETRICS & GYNECOLOGY

## 2022-12-07 RX ORDER — GABAPENTIN 400 MG/1
400 CAPSULE ORAL 3 TIMES DAILY
COMMUNITY
Start: 2022-10-19

## 2022-12-07 RX ORDER — POTASSIUM CHLORIDE 750 MG/1
10 CAPSULE, EXTENDED RELEASE ORAL DAILY
COMMUNITY
Start: 2022-11-21

## 2022-12-07 RX ORDER — ESTRADIOL 10 UG/1
1 INSERT VAGINAL 2 TIMES WEEKLY
Qty: 8 TABLET | Refills: 12 | Status: SHIPPED | OUTPATIENT
Start: 2022-12-08

## 2022-12-07 RX ORDER — ESTRADIOL 0.1 MG/D
1 FILM, EXTENDED RELEASE TRANSDERMAL 2 TIMES WEEKLY
Qty: 24 PATCH | Refills: 4 | Status: SHIPPED | OUTPATIENT
Start: 2022-12-08 | End: 2024-02-01

## 2022-12-07 NOTE — PROGRESS NOTES
Subjective   Chief Complaint   Patient presents with   • Gynecologic Exam     Yearly exam ans pap smear     Tammi Murphy is a 37 y.o. year old .  Patient's last menstrual period was 2021.  She presents to be seen because of annual exam.   TLH/ BSO done for migraines menstrual- patient now states has had a couple of migraines in the past several months- maybe work related  OTHER COMPLAINTS:  Nothing else    The following portions of the patient's history were reviewed and updated as appropriate:  She  has a past medical history of Acid reflux, Allergy, Anxiety, Arthritis, Asthma, Bladder incontinence, Constipation, Cough, Depression, Depression, Difficulty walking, Kidney stones, Migraine, Pain with urination, PONV (postoperative nausea and vomiting), and Urinary frequency.  She does not have any pertinent problems on file.  She  has a past surgical history that includes Kidney stone surgery (); Oophorectomy (Right, ); Esophagogastroduodenoscopy; and total laparoscopic hysterectomy (Left, 3/25/2021).  Her family history includes Cystic fibrosis in her maternal aunt; Diabetes in her paternal grandmother; Hyperlipidemia in her paternal grandmother; Hypertension in her paternal grandmother; Ovarian cancer in her mother and paternal grandmother; Stroke in her paternal grandmother.  She  reports that she has been smoking cigarettes. She has been smoking an average of 1 pack per day. She has never used smokeless tobacco. She reports that she does not drink alcohol and does not use drugs.  Current Outpatient Medications   Medication Sig Dispense Refill   • acetaminophen (TYLENOL) 500 MG tablet Take 2 tablets by mouth Every 8 (Eight) Hours. Alternate with ibuprofen 60 tablet 0   • amitriptyline (ELAVIL) 25 MG tablet Take 25 mg by mouth Every Night.     • clonazePAM (KlonoPIN) 1 MG tablet Take 1 mg by mouth 3 (Three) Times a Day As Needed. for anxiety     • cyclobenzaprine (FLEXERIL) 10 MG tablet      •  EPINEPHrine (EPIPEN) 0.3 MG/0.3ML solution auto-injector injection epinephrine 0.3 mg/0.3 mL injection, auto-injector     • estradiol (VAGIFEM) 10 MCG tablet vaginal tablet Insert 1 tablet into the vagina 2 (Two) Times a Week. 8 tablet 12   • estradiol (Vivelle-Dot) 0.1 MG/24HR patch Place 1 patch on the skin as directed by provider 2 (Two) Times a Week. 24 patch 4   • gabapentin (NEURONTIN) 400 MG capsule Take 400 mg by mouth 3 (Three) Times a Day.     • hydrOXYzine pamoate (VISTARIL) 25 MG capsule Take 25 mg by mouth 3 (Three) Times a Day As Needed.     • ibuprofen (ADVIL,MOTRIN) 800 MG tablet Take 1 tablet by mouth Every 8 (Eight) Hours As Needed for Mild Pain . 40 tablet 0   • lansoprazole (PREVACID) 30 MG capsule      • montelukast (SINGULAIR) 10 MG tablet Take 10 mg by mouth Every Night.  0   • ondansetron ODT (ZOFRAN-ODT) 4 MG disintegrating tablet Place 1 tablet under the tongue Every 6 (Six) Hours As Needed for Nausea or Vomiting. 10 tablet 0   • potassium chloride (MICRO-K) 10 MEQ CR capsule Take 10 mEq by mouth Daily.     • PROAIR  (90 Base) MCG/ACT inhaler Inhale 2 puffs Every 4 (Four) Hours As Needed.  0   • SUMAtriptan (IMITREX) 100 MG tablet   0   • venlafaxine XR (EFFEXOR-XR) 150 MG 24 hr capsule Take 150 mg by mouth Daily.  0   • butalbital-acetaminophen-caffeine (FIORICET, ESGIC) -40 MG per tablet TAKE 1 TABLET BY MOUTH TWICE DAILY AS NEEDED FOR HEADACHE     • cyproheptadine (PERIACTIN) 4 MG tablet Take 1 tablet by mouth Daily. 30 tablet 5   • docusate sodium (Colace) 100 MG capsule Take 1 capsule by mouth 2 (Two) Times a Day. 60 capsule 0   • Spacer/Aero-Holding Chambers (AeroChamber Plus Ben-Vu) misc        No current facility-administered medications for this visit.     Current Outpatient Medications on File Prior to Visit   Medication Sig   • acetaminophen (TYLENOL) 500 MG tablet Take 2 tablets by mouth Every 8 (Eight) Hours. Alternate with ibuprofen   • amitriptyline (ELAVIL) 25  MG tablet Take 25 mg by mouth Every Night.   • clonazePAM (KlonoPIN) 1 MG tablet Take 1 mg by mouth 3 (Three) Times a Day As Needed. for anxiety   • cyclobenzaprine (FLEXERIL) 10 MG tablet    • EPINEPHrine (EPIPEN) 0.3 MG/0.3ML solution auto-injector injection epinephrine 0.3 mg/0.3 mL injection, auto-injector   • estradiol (VAGIFEM) 10 MCG tablet vaginal tablet Insert 1 tablet into the vagina 2 (Two) Times a Week.   • estradiol (Vivelle-Dot) 0.1 MG/24HR patch Place 1 patch on the skin as directed by provider 2 (Two) Times a Week.   • gabapentin (NEURONTIN) 400 MG capsule Take 400 mg by mouth 3 (Three) Times a Day.   • hydrOXYzine pamoate (VISTARIL) 25 MG capsule Take 25 mg by mouth 3 (Three) Times a Day As Needed.   • ibuprofen (ADVIL,MOTRIN) 800 MG tablet Take 1 tablet by mouth Every 8 (Eight) Hours As Needed for Mild Pain .   • lansoprazole (PREVACID) 30 MG capsule    • montelukast (SINGULAIR) 10 MG tablet Take 10 mg by mouth Every Night.   • ondansetron ODT (ZOFRAN-ODT) 4 MG disintegrating tablet Place 1 tablet under the tongue Every 6 (Six) Hours As Needed for Nausea or Vomiting.   • potassium chloride (MICRO-K) 10 MEQ CR capsule Take 10 mEq by mouth Daily.   • PROAIR  (90 Base) MCG/ACT inhaler Inhale 2 puffs Every 4 (Four) Hours As Needed.   • SUMAtriptan (IMITREX) 100 MG tablet    • venlafaxine XR (EFFEXOR-XR) 150 MG 24 hr capsule Take 150 mg by mouth Daily.   • butalbital-acetaminophen-caffeine (FIORICET, ESGIC) -40 MG per tablet TAKE 1 TABLET BY MOUTH TWICE DAILY AS NEEDED FOR HEADACHE   • cyproheptadine (PERIACTIN) 4 MG tablet Take 1 tablet by mouth Daily.   • docusate sodium (Colace) 100 MG capsule Take 1 capsule by mouth 2 (Two) Times a Day.   • Spacer/Aero-Holding Chambers (AeroChamber Plus Ben-Vu) [facility] [place]      No current facility-administered medications on file prior to visit.     She is allergic to droperidol, penicillins, levofloxacin, peanut (diagnostic), adhesive tape, and shellfish  allergy.    Social History    Tobacco Use      Smoking status: Every Day        Packs/day: 1.00        Types: Cigarettes      Smokeless tobacco: Never    Review of Systems  Consitutional POS: nothing reported    NEG: anorexia or night sweats   Gastointestinal POS: nothing reported    NEG: bloating, change in bowel habits, melena or reflux symptoms   Genitourinary POS: nothing reported    NEG: dysuria or hematuria   Integument POS: nothing reported    NEG: moles that are changing in size, shape, color or rashes   Breast POS: nothing reported    NEG: persistent breast lump, skin dimpling or nipple discharge         Respiratory: negative  Cardiovascular: negative          Objective   /68   Wt 73.6 kg (162 lb 3.2 oz)   LMP 03/05/2021   BMI 26.99 kg/m²     General:  well developed; well nourished  no acute distress   Skin:  No suspicious lesions seen   Thyroid: normal to inspection and palpation   Lungs:  breathing is unlabored  clear to auscultation bilaterally   Heart:  regular rate and rhythm, S1, S2 normal, no murmur, click, rub or gallop   Breasts:  Examined in supine position  Symmetric without masses or skin dimpling  Nipples normal without inversion, lesions or discharge  There are no palpable axillary nodes   Abdomen: soft, non-tender; no masses  no umbilical or inguinal hernias are present  no hepato-splenomegaly   Pelvis: Clinical staff was present for exam  External genitalia:  normal appearance of the external genitalia including Bartholin's and Peninsula's glands.  :  urethral meatus normal;  Vaginal:  normal pink mucosa without prolapse or lesions.  Cervix:  absent.  Uterus:  absent.  Adnexa:  absent, bilateral.  Rectal:  digital rectal exam not performed; anus visually normal appearing.     Psychiatric: Alert and oriented ×3, mood and affect appropriate  HEENT: Atraumatic, normocephalic, normal scleral icterus  Extremities: 2+ pulses bilaterally, no edema      Lab Review   No data  reviewed    Imaging   No data reviewed        Assessment   1. PE WNL  2. HRT     Plan   1. PAP done  2. Estradiol 0.1 ptach refilled  3. Vagifem 10mcg 2 x week refilled  4. Baseline MMG  5. Keep eye of migraine frequency and if wrosens may need to drop E2 patch dosage    No orders of the defined types were placed in this encounter.         This note was electronically signed.      December 7, 2022

## 2022-12-12 LAB — REF LAB TEST METHOD: NORMAL

## 2024-07-10 NOTE — TELEPHONE ENCOUNTER
ATTEMPTED TO CONTACT PT FOR BOTOX SCHEDULING.  NO ANSWER, LEFT VM  
ATTEMPTED TO CONTACT PT, LEFT VM.  CAN BE SCHEDULED FOR BOTOX INJECTION.  
Quality 137: Melanoma: Continuity Of Care - Recall System: Patient information entered into a recall system that includes: target date for the next exam specified AND a process to follow up with patients regarding missed or unscheduled appointments
Detail Level: Detailed
Quality 130: Documentation Of Current Medications In The Medical Record: Current Medications Documented

## (undated) DEVICE — SUTURING DEVICE: Brand: ENDO STITCH

## (undated) DEVICE — PENCL ES MEGADINE EZ/CLEAN BUTN W/HOLSTR 10FT

## (undated) DEVICE — PAD STEEP TRENDELENBURG W/RAIL STRAP INTEGR ARM PROTECT WING

## (undated) DEVICE — SUT ETHLN 4/0 PS2 PLSTC 1667G

## (undated) DEVICE — CONN TBG Y 5 IN 1 LF STRL

## (undated) DEVICE — PREMIUM WET SKIN PREP TRAY CHG: Brand: MEDLINE INDUSTRIES, INC.

## (undated) DEVICE — SYR LUERLOK 50ML

## (undated) DEVICE — DISPOSABLE MONOPOLAR ENDOSCOPIC CORD 10 FT. (3M): Brand: KIRWAN

## (undated) DEVICE — SOL NACL 0.9PCT 1000ML

## (undated) DEVICE — ST IRR CYSTO W/SPK 77IN LF

## (undated) DEVICE — DEV LIGASURE LAP/SEALER/DIV MARYLAND JAW 23CM

## (undated) DEVICE — JELLY,LUBE,STERILE,FLIP TOP,TUBE,2-OZ: Brand: MEDLINE

## (undated) DEVICE — SLV SCD CALF HEMOFORCE DVT THERP REPROC MD

## (undated) DEVICE — ENDOPATH XCEL BLADELESS TROCARS WITH STABILITY SLEEVES: Brand: ENDOPATH XCEL

## (undated) DEVICE — SOL IRR H2O BTL 1000ML STRL

## (undated) DEVICE — TOTAL TRAY, 16FR 10ML SIL FOLEY, URN: Brand: MEDLINE

## (undated) DEVICE — GLV SURG BIOGEL M LTX PF 8

## (undated) DEVICE — HDRST POSTN SLOTTED A/

## (undated) DEVICE — 4-PORT MANIFOLD: Brand: NEPTUNE 2

## (undated) DEVICE — PATIENT RETURN ELECTRODE, SINGLE-USE, CONTACT QUALITY MONITORING, ADULT, WITH 9FT CORD, FOR PATIENTS WEIGING OVER 33LBS. (15KG): Brand: MEGADYNE

## (undated) DEVICE — MANIP UTER ARCH KOHEFFICIENT 4.0CM

## (undated) DEVICE — SUT VIC 0 CT 36IN J958H

## (undated) DEVICE — HARMONIC HD 1000I SHEARS, 36CM SHAFT LENGTH: Brand: HARMONIC

## (undated) DEVICE — 2, DISPOSABLE SUCTION/IRRIGATOR WITHOUT DISPOSABLE TIP: Brand: STRYKEFLOW

## (undated) DEVICE — MANIP UTER RUMI TP 6.7MMX8CM BLU

## (undated) DEVICE — MANIP UTER RUMI TP 6.7MM 10CM GRN

## (undated) DEVICE — RICH LAVH: Brand: MEDLINE INDUSTRIES, INC.

## (undated) DEVICE — GLV SURG SENSICARE W/ALOE PF LF 7.5 STRL

## (undated) DEVICE — MARKR SKIN W/RULR

## (undated) DEVICE — PLUG CATH W/CAP

## (undated) DEVICE — FLTR PLUMEPORT LAP W/CONN STRL

## (undated) DEVICE — SPNG GZ WOVN 4X4IN 12PLY 10/BX STRL